# Patient Record
Sex: FEMALE | Race: WHITE | NOT HISPANIC OR LATINO | Employment: OTHER | URBAN - METROPOLITAN AREA
[De-identification: names, ages, dates, MRNs, and addresses within clinical notes are randomized per-mention and may not be internally consistent; named-entity substitution may affect disease eponyms.]

---

## 2020-04-15 ENCOUNTER — TELEMEDICINE (OUTPATIENT)
Dept: FAMILY MEDICINE CLINIC | Facility: CLINIC | Age: 70
End: 2020-04-15

## 2020-04-15 ENCOUNTER — TELEPHONE (OUTPATIENT)
Dept: FAMILY MEDICINE CLINIC | Facility: CLINIC | Age: 70
End: 2020-04-15

## 2020-04-15 DIAGNOSIS — F33.42 RECURRENT MAJOR DEPRESSIVE DISORDER, IN FULL REMISSION (HCC): ICD-10-CM

## 2020-04-15 DIAGNOSIS — M47.814 THORACIC SPONDYLOSIS: ICD-10-CM

## 2020-04-15 DIAGNOSIS — E78.2 MIXED HYPERLIPIDEMIA: ICD-10-CM

## 2020-04-15 DIAGNOSIS — M81.0 AGE-RELATED OSTEOPOROSIS WITHOUT CURRENT PATHOLOGICAL FRACTURE: Primary | ICD-10-CM

## 2020-04-15 PROCEDURE — 99203 OFFICE O/P NEW LOW 30 MIN: CPT | Performed by: FAMILY MEDICINE

## 2020-04-15 RX ORDER — TRAMADOL HYDROCHLORIDE 50 MG/1
50 TABLET ORAL EVERY 8 HOURS PRN
Qty: 60 TABLET | Refills: 2 | Status: SHIPPED | OUTPATIENT
Start: 2020-04-15 | End: 2020-08-31 | Stop reason: SDUPTHER

## 2020-04-15 NOTE — PROGRESS NOTES
Subjective   Yeimi Larson is a 69 y.o. female.     There were no vitals filed for this visit.     Chief Complaint   Patient presents with   • Establish Care     new pt [place] mo today with dr sosa    • Osteoarthritis     follow up on tramadol    • Depression     follow up no complains         History of Present Illness    This new patient appointment was converted to a video visit in accordance with CDC recommendations and guidelines given the current coronavirus pandemic    New patient here for thoracic arthritis, osteoporosis, depression, and hyperlipidemia    Patient's previous primary care provider was Dr. Jenelle Graves .  And prior to that [facility].  At this time, I do not have records from her most recent provider, as a consent has not been obtained yet.    Patient has thoracic spondylosis and has been treated with meloxicam and tramadol.  Her discomfort is well controlled with meloxicam 1/day and tramadol 2/day.  Elliot report shows that this was last filled January 29 quantity of 60.  Patient has been running low and trying to make her quantity last given her change of providers.  No aberrant behaviors.  Needs refill today    Patient's osteoporosis has previously been treated with Prolia injections.  Her last injection was well over 6 months.  Unfortunately, she was waiting for this to be approved by her last provider yet for some reason it did not happen.  She reports to me that her last bone density was approximately 2 years ago.  She is uncertain as to the location/hospital where this was done.  At this time I cannot find it in the chart review.    Patient's depression has been well controlled with Lexapro 10 mg.  She does not need a refill at this time    Patient reports she has a history of hyperlipidemia.  She is currently is on no medication for this.  She believes she has improved her diet and exercises regularly.  Thus, she believes her cholesterol levels will look much better.   She reports her lab was last checked approximately July 2019    The following portions of the patient's history were reviewed and updated as appropriate: allergies, current medications, past family history, past medical history, past social history, past surgical history and problem list.    Review of Systems   Constitutional: Negative.    HENT: Negative.    Respiratory: Negative.    Cardiovascular: Negative for chest pain and leg swelling.   Gastrointestinal: Negative.    Endocrine: Negative.    Genitourinary: Negative.    Musculoskeletal: Positive for arthralgias, back pain and myalgias.   Allergic/Immunologic: Negative.    Neurological: Negative.    Hematological: Negative.    Psychiatric/Behavioral: Negative.    I have reviewed and confirmed the accuracy of the ROS as documented by the MA/LPN/RN Gregoria Cerna MD      Objective   Physical Exam   Constitutional: She is oriented to person, place, and time. She appears well-developed. No distress.   HENT:   Head: Normocephalic and atraumatic.   Pulmonary/Chest: Effort normal.   Neurological: She is alert and oriented to person, place, and time.   Psychiatric: She has a normal mood and affect. Her behavior is normal. Judgment and thought content normal.   Nursing note and vitals reviewed.       LABS/STUDIES --per patient report labs last done July 2019 which included a hepatitis screen and lipid screen    Procedures     Assessment/Plan   Yeimi was seen today for establish care, osteoarthritis and depression.    Diagnoses and all orders for this visit:    Age-related osteoporosis without current pathological fracture --stable, I believe patient may be due for bone density.  At this time will try to obtain old bone density report.  Likely will need to schedule a bone density in the near future and then further determine treatment.?  Prolia injections.  In the interim may continue calcium along with vitamin D as prescribed.  Also continue healthy diet and regular  exercise    Thoracic spondylosis --stable.  Elliot reviewed.  Will refill tramadol 50 mg quantity of 60 per 30 days with 2 refills  -     traMADol (ULTRAM) 50 MG tablet; Take 1 tablet by mouth Every 8 (Eight) Hours As Needed for Moderate Pain .    Recurrent major depressive disorder, in full remission (CMS/HCC) --stable, no change to Lexapro.  Will refill upon request    Mixed hyperlipidemia --continue healthy diet and exercise.  Will recheck in near future at next appointment.  CMP and lipids needed        Video visit time 30 minutes         Return in about 3 months (around 7/15/2020) for Recheck.     This was an audio and video enabled telemedicine encounter.

## 2020-04-15 NOTE — TELEPHONE ENCOUNTER
PT HAD AN APPOINTMENT EARLIER TODAY AND MS HOWELL WANTED TO KNOW THE FACILITY NAME WHERE THE PATIENT HAD A BONE DENSITY AND COLONOSCOPY.     [facility]

## 2020-05-04 ENCOUNTER — TELEPHONE (OUTPATIENT)
Dept: FAMILY MEDICINE CLINIC | Facility: CLINIC | Age: 70
End: 2020-05-04

## 2020-05-04 RX ORDER — ESCITALOPRAM OXALATE 10 MG/1
10 TABLET ORAL DAILY
Qty: 90 TABLET | Refills: 3 | Status: SHIPPED | OUTPATIENT
Start: 2020-05-04 | End: 2020-12-01

## 2020-05-04 RX ORDER — MELOXICAM 15 MG/1
15 TABLET ORAL DAILY
Qty: 30 TABLET | Refills: 3 | Status: SHIPPED | OUTPATIENT
Start: 2020-05-04 | End: 2020-10-02

## 2020-08-31 ENCOUNTER — OFFICE VISIT (OUTPATIENT)
Dept: FAMILY MEDICINE CLINIC | Facility: CLINIC | Age: 70
End: 2020-08-31

## 2020-08-31 VITALS
SYSTOLIC BLOOD PRESSURE: 112 MMHG | TEMPERATURE: 97.8 F | DIASTOLIC BLOOD PRESSURE: 64 MMHG | BODY MASS INDEX: 21.95 KG/M2 | HEIGHT: 64 IN | OXYGEN SATURATION: 97 % | WEIGHT: 128.6 LBS | HEART RATE: 71 BPM

## 2020-08-31 DIAGNOSIS — Z79.899 HIGH RISK MEDICATION USE: ICD-10-CM

## 2020-08-31 DIAGNOSIS — E78.2 MIXED HYPERLIPIDEMIA: ICD-10-CM

## 2020-08-31 DIAGNOSIS — M47.814 THORACIC SPONDYLOSIS: Primary | ICD-10-CM

## 2020-08-31 DIAGNOSIS — F33.42 RECURRENT MAJOR DEPRESSIVE DISORDER, IN FULL REMISSION (HCC): ICD-10-CM

## 2020-08-31 DIAGNOSIS — M81.0 AGE-RELATED OSTEOPOROSIS WITHOUT CURRENT PATHOLOGICAL FRACTURE: ICD-10-CM

## 2020-08-31 PROCEDURE — 99214 OFFICE O/P EST MOD 30 MIN: CPT | Performed by: FAMILY MEDICINE

## 2020-08-31 RX ORDER — TRAMADOL HYDROCHLORIDE 50 MG/1
50 TABLET ORAL EVERY 8 HOURS PRN
Qty: 60 TABLET | Refills: 2 | Status: SHIPPED | OUTPATIENT
Start: 2020-09-17 | End: 2020-12-01 | Stop reason: SDUPTHER

## 2020-08-31 NOTE — PROGRESS NOTES
Subjective   Yeimi Larson is a 69 y.o. female.     Vitals:    08/31/20 0958   BP: 112/64   Pulse: 71   Temp: 97.8 °F (36.6 °C)   SpO2: 97%        Chief Complaint   Patient presents with   • Osteoarthritis     new to [facility] get established update Tramadol   • Depression     refill escitalopram   • Hyperlipidemia   • Osteoporosis        History of Present Illness    4-month follow-up for thoracic osteoarthritis, depression, hyperlipidemia, and osteoporosis    LOV with me here via telehealth to get established (new patient to me) and medication refills.  No changes made.  Medical chart has not been obtained from previous doctor yet certain documents have been scanned.  Currently, doing okay even despite the pandemic.  Staying home and practicing safe social distancing.  Trying to maintain an active lifestyle with daily walks.  No complaints today  Needs all refills and lab work due?    Thoracic spondylosis/DDD has been well controlled with meloxicam daily and tramadol 2/day.  Patient states she has been on this regimen for quite some years.  S/P physical therapy in past.  Elliot reviewed by me today shows tramadol 50 mg quantity of 60 last refilled August 18, 2020.  No aberrant behaviors.    INGRIS with depression has been well controlled with Lexapro 10 mg daily.  Tolerates medication without side effects.  Sleeps well.  Tries to maintain a healthy well-balanced diet and regular exercise.  Request refill today    History of hyperlipidemia has been well controlled with diet and exercise only.  Currently on no medications at this time.  Tries to maintain a healthy well-balanced diet and walks on a daily basis.  Weight stable.  Patient states her last blood work has been well over 1 year ago, fasting today.    History of osteoporosis was previously treated with Prolia injections in the past.  Patient believes her last Prolia injection was approximately 6 months ago?  Currently taking calcium 1200 mg daily.  Last DEXA scan  done October 2018.    The following portions of the patient's history were reviewed and updated as appropriate: allergies, current medications, past family history, past medical history, past social history, past surgical history and problem list.    Review of Systems   Constitutional: Negative for unexpected weight gain and unexpected weight loss.   Respiratory: Negative for cough and shortness of breath.    Cardiovascular: Negative for chest pain.   I have reviewed and confirmed the accuracy of the ROS as documented by the MA/LPN/RN Gregoria Cerna MD      Objective   Physical Exam   Constitutional: She appears well-developed.   HENT:   Head: Normocephalic and atraumatic.   Eyes: Pupils are equal, round, and reactive to light. Conjunctivae are normal.   Neck: Normal range of motion. Neck supple. No thyromegaly present.   Cardiovascular: Normal rate, regular rhythm and normal heart sounds.   Pulmonary/Chest: Effort normal and breath sounds normal.   Abdominal: Soft. Bowel sounds are normal. She exhibits no distension. There is no hepatosplenomegaly. There is no tenderness.   Musculoskeletal: She exhibits no edema.   Lymphadenopathy:     She has no cervical adenopathy.   Psychiatric: She has a normal mood and affect. Her behavior is normal. Judgment and thought content normal.   Nursing note and vitals reviewed.       LABS/STUDIES --last labs over 1 year ago, records unavailable                              --DEXA October 2018, lumbar spine T score -3.9, left femoral neck T score -2.1    Procedures     Assessment/Plan   Yeimi was seen today for osteoarthritis, depression, hyperlipidemia and osteoporosis.    Diagnoses and all orders for this visit:    Thoracic spondylosis --stable.  Elliot reviewed.  Will update urine tox screen for compliance today.  No change in medication.  Continue/refill Mobic 15 mg daily.  And, continue/refill today tramadol 50 mg, quantity 60 per 30 to 60 days as directed below with 2  refills.  -     traMADol (ULTRAM) 50 MG tablet; Take 1 tablet by mouth Every 8 (Eight) Hours As Needed for Moderate Pain .  -     ToxASSURE Select 13 Discrete -    Mixed hyperlipidemia --controlled?  Recheck lipids and CMP today.  Previously has been diet controlled.  Low risk patient, as long as LDL remains less than 160 then may remain off medication and continue with diet and exercise only.  -     Comprehensive Metabolic Panel  -     Lipid Panel With LDL / HDL Ratio  -     TSH    Recurrent major depressive disorder, in full remission (CMS/HCC) stable.  No change in medication.  Recheck TSH today.  Will continue/refill Lexapro 10 mg 1 p.o. daily, will refill upon request.  -     TSH    Age-related osteoporosis without current pathological fracture stable.  S/P Prolia injections over the last 2 years.  Will continue calcium 1200 mg daily.  Plan to recheck DEXA scan at next visit in 3 months.    High risk medication use  -     ToxASSURE Select 13 Discrete -                 Return in about 3 months (around 11/30/2020) for Medicare Wellness.

## 2020-09-03 LAB
6MAM UR QL CFM: NEGATIVE
6MAM/CREAT UR: NOT DETECTED NG/MG CREAT
7AMINOCLONAZEPAM/CREAT UR: NOT DETECTED NG/MG CREAT
A-OH ALPRAZ/CREAT UR: NOT DETECTED NG/MG CREAT
A-OH-TRIAZOLAM/CREAT UR CFM: NOT DETECTED NG/MG CREAT
ALBUMIN SERPL-MCNC: 4.7 G/DL (ref 3.5–5.2)
ALBUMIN/GLOB SERPL: 2 G/DL
ALFENTANIL/CREAT UR CFM: NOT DETECTED NG/MG CREAT
ALP SERPL-CCNC: 84 U/L (ref 39–117)
ALPHA-HYDROXYMIDAZOLAM, URINE: NOT DETECTED NG/MG CREAT
ALPRAZ/CREAT UR CFM: NOT DETECTED NG/MG CREAT
ALT SERPL-CCNC: 8 U/L (ref 1–33)
AMOBARBITAL UR QL CFM: NOT DETECTED
AMPHET/CREAT UR: NOT DETECTED NG/MG CREAT
AMPHETAMINES UR QL CFM: NEGATIVE
AST SERPL-CCNC: 19 U/L (ref 1–32)
BARBITAL UR QL CFM: NOT DETECTED
BARBITURATES UR QL CFM: NEGATIVE
BENZODIAZ UR QL CFM: NEGATIVE
BILIRUB SERPL-MCNC: 0.6 MG/DL (ref 0–1.2)
BUN SERPL-MCNC: 17 MG/DL (ref 8–23)
BUN/CREAT SERPL: 21.3 (ref 7–25)
BUPRENORPHINE UR QL CFM: NEGATIVE
BUPRENORPHINE/CREAT UR: NOT DETECTED NG/MG CREAT
BUTABARBITAL UR QL CFM: NOT DETECTED
BUTALBITAL UR QL CFM: NOT DETECTED
BZE/CREAT UR: NOT DETECTED NG/MG CREAT
CALCIUM SERPL-MCNC: 9.2 MG/DL (ref 8.6–10.5)
CANNABINOIDS UR QL CFM: NEGATIVE
CARBOXYTHC/CREAT UR: NOT DETECTED NG/MG CREAT
CHLORIDE SERPL-SCNC: 103 MMOL/L (ref 98–107)
CHOLEST SERPL-MCNC: 230 MG/DL (ref 0–200)
CLONAZEPAM/CREAT UR CFM: NOT DETECTED NG/MG CREAT
CO2 SERPL-SCNC: 30.7 MMOL/L (ref 22–29)
COCAETHYLENE/CREAT UR CFM: NOT DETECTED NG/MG CREAT
COCAINE UR QL CFM: NEGATIVE
COCAINE/CREAT UR CFM: NOT DETECTED NG/MG CREAT
CODEINE/CREAT UR: NOT DETECTED NG/MG CREAT
CREAT SERPL-MCNC: 0.8 MG/DL (ref 0.57–1)
CREAT UR-MCNC: 219 MG/DL
DESALKYLFLURAZ/CREAT UR: NOT DETECTED NG/MG CREAT
DESMETHYLFLUNITRAZEPAM: NOT DETECTED NG/MG CREAT
DHC/CREAT UR: NOT DETECTED NG/MG CREAT
DIAZEPAM/CREAT UR: NOT DETECTED NG/MG CREAT
DRUGS UR: NORMAL
EDDP/CREAT UR: NOT DETECTED NG/MG CREAT
ETHANOL UR CFM-MCNC: NOT DETECTED G/DL
ETHANOL UR QL CFM: NEGATIVE
FENTANYL UR QL CFM: NEGATIVE
FENTANYL/CREAT UR: NOT DETECTED NG/MG CREAT
FLUNITRAZEPAM UR QL CFM: NOT DETECTED NG/MG CREAT
GLOBULIN SER CALC-MCNC: 2.3 GM/DL
GLUCOSE SERPL-MCNC: 90 MG/DL (ref 65–99)
HDLC SERPL-MCNC: 69 MG/DL (ref 40–60)
HYDROCODONE/CREAT UR: NOT DETECTED NG/MG CREAT
HYDROMORPHONE/CREAT UR: NOT DETECTED NG/MG CREAT
LDLC SERPL CALC-MCNC: 139 MG/DL (ref 0–100)
LDLC/HDLC SERPL: 2.01 {RATIO}
LEVEL OF DETECTION:: NORMAL
LORAZEPAM/CREAT UR: NOT DETECTED NG/MG CREAT
MDA/CREAT UR: NOT DETECTED NG/MG CREAT
MDMA/CREAT UR: NOT DETECTED NG/MG CREAT
MEPHOBARBITAL UR QL CFM: NOT DETECTED
METHADONE UR QL CFM: NEGATIVE
METHADONE/CREAT UR: NOT DETECTED NG/MG CREAT
METHAMPHET/CREAT UR: NOT DETECTED NG/MG CREAT
MIDAZOLAM/CREAT UR CFM: NOT DETECTED NG/MG CREAT
MORPHINE/CREAT UR: NOT DETECTED NG/MG CREAT
N-NORTRAMADOL/CREAT UR CFM: 1580 NG/MG CREAT
NARCOTICS UR: NORMAL
NORBUPRENORPHINE/CREAT UR: NOT DETECTED NG/MG CREAT
NORCODEINE/CREAT UR CFM: NOT DETECTED NG/MG CREAT
NORDIAZEPAM/CREAT UR: NOT DETECTED NG/MG CREAT
NORFENTANYL/CREAT UR: NOT DETECTED NG/MG CREAT
NORHYDROCODONE/CREAT UR: NOT DETECTED NG/MG CREAT
NORMORPHINE UR-MCNC: NOT DETECTED NG/MG CREAT
NOROXYCODONE/CREAT UR: NOT DETECTED NG/MG CREAT
NOROXYMORPHONE/CREAT UR CFM: NOT DETECTED NG/MG CREAT
O-NORTRAMADOL UR CFM-MCNC: >2283 NG/MG CREAT
OPIATES UR QL CFM: NEGATIVE
OXAZEPAM/CREAT UR: NOT DETECTED NG/MG CREAT
OXYCODONE UR QL CFM: NEGATIVE
OXYCODONE/CREAT UR: NOT DETECTED NG/MG CREAT
OXYMORPHONE/CREAT UR: NOT DETECTED NG/MG CREAT
PENTOBARB UR QL CFM: NOT DETECTED
PHENOBARB UR QL CFM: NOT DETECTED
POTASSIUM SERPL-SCNC: 5 MMOL/L (ref 3.5–5.2)
PROT SERPL-MCNC: 7 G/DL (ref 6–8.5)
SECOBARBITAL UR QL CFM: NOT DETECTED
SODIUM SERPL-SCNC: 142 MMOL/L (ref 136–145)
SUFENTANIL/CREAT UR CFM: NOT DETECTED NG/MG CREAT
TAPENTADOL UR QL CFM: NEGATIVE
TAPENTADOL/CREAT UR: NOT DETECTED NG/MG CREAT
TEMAZEPAM/CREAT UR: NOT DETECTED NG/MG CREAT
THIOPENTAL UR QL CFM: NOT DETECTED
TRAMADOL UR QL CFM: 1715 NG/MG CREAT
TRIGL SERPL-MCNC: 112 MG/DL (ref 0–150)
TSH SERPL DL<=0.005 MIU/L-ACNC: 1.13 UIU/ML (ref 0.27–4.2)
VLDLC SERPL CALC-MCNC: 22.4 MG/DL

## 2020-10-02 RX ORDER — MELOXICAM 15 MG/1
TABLET ORAL
Qty: 90 TABLET | Refills: 0 | Status: SHIPPED | OUTPATIENT
Start: 2020-10-02 | End: 2020-12-19

## 2020-12-01 ENCOUNTER — RESULTS ENCOUNTER (OUTPATIENT)
Dept: FAMILY MEDICINE CLINIC | Facility: CLINIC | Age: 70
End: 2020-12-01

## 2020-12-01 ENCOUNTER — OFFICE VISIT (OUTPATIENT)
Dept: FAMILY MEDICINE CLINIC | Facility: CLINIC | Age: 70
End: 2020-12-01

## 2020-12-01 VITALS
HEIGHT: 64 IN | DIASTOLIC BLOOD PRESSURE: 64 MMHG | SYSTOLIC BLOOD PRESSURE: 112 MMHG | TEMPERATURE: 95.6 F | HEART RATE: 73 BPM | OXYGEN SATURATION: 97 % | WEIGHT: 130 LBS | BODY MASS INDEX: 22.2 KG/M2

## 2020-12-01 DIAGNOSIS — Z00.00 ENCOUNTER FOR MEDICARE ANNUAL WELLNESS EXAM: Primary | ICD-10-CM

## 2020-12-01 DIAGNOSIS — Z12.11 ENCOUNTER FOR SCREENING COLONOSCOPY: ICD-10-CM

## 2020-12-01 DIAGNOSIS — F33.0 MILD EPISODE OF RECURRENT MAJOR DEPRESSIVE DISORDER (HCC): ICD-10-CM

## 2020-12-01 DIAGNOSIS — Z12.31 ENCOUNTER FOR SCREENING MAMMOGRAM FOR BREAST CANCER: ICD-10-CM

## 2020-12-01 DIAGNOSIS — M81.0 AGE-RELATED OSTEOPOROSIS WITHOUT CURRENT PATHOLOGICAL FRACTURE: ICD-10-CM

## 2020-12-01 DIAGNOSIS — M47.814 THORACIC SPONDYLOSIS: ICD-10-CM

## 2020-12-01 PROBLEM — Z87.19 HISTORY OF DIVERTICULOSIS: Status: ACTIVE | Noted: 2020-12-01

## 2020-12-01 PROCEDURE — 96160 PT-FOCUSED HLTH RISK ASSMT: CPT | Performed by: FAMILY MEDICINE

## 2020-12-01 PROCEDURE — 1170F FXNL STATUS ASSESSED: CPT | Performed by: FAMILY MEDICINE

## 2020-12-01 PROCEDURE — 1160F RVW MEDS BY RX/DR IN RCRD: CPT | Performed by: FAMILY MEDICINE

## 2020-12-01 PROCEDURE — G0439 PPPS, SUBSEQ VISIT: HCPCS | Performed by: FAMILY MEDICINE

## 2020-12-01 PROCEDURE — 99214 OFFICE O/P EST MOD 30 MIN: CPT | Performed by: FAMILY MEDICINE

## 2020-12-01 PROCEDURE — 1125F AMNT PAIN NOTED PAIN PRSNT: CPT | Performed by: FAMILY MEDICINE

## 2020-12-01 RX ORDER — TRAMADOL HYDROCHLORIDE 50 MG/1
50 TABLET ORAL EVERY 8 HOURS PRN
Qty: 60 TABLET | Refills: 2 | Status: SHIPPED | OUTPATIENT
Start: 2020-12-16 | End: 2021-03-02 | Stop reason: SDUPTHER

## 2020-12-01 RX ORDER — ESCITALOPRAM OXALATE 20 MG/1
20 TABLET ORAL DAILY
Qty: 90 TABLET | Refills: 1 | Status: SHIPPED | OUTPATIENT
Start: 2020-12-01 | End: 2021-02-15

## 2020-12-01 NOTE — PROGRESS NOTES
The ABCs of the Annual Wellness Visit  Subsequent Medicare Wellness Visit    Chief Complaint   Patient presents with   • Medicare Wellness-subsequent     NO COMPLAINS   • Osteoarthritis     FOLLOW UP ON TRAMADOL NO COMPLAINS DOING WELL    • Anxiety     Having issues with sleep and stress with 's health   • Depression       Subjective   History of Present Illness:  Yeimi Larson is a 69 y.o. female who presents for a Subsequent Medicare Wellness Visit    Presents for yearly wellness exam and 3-month follow-up for chronic back pain and complains of increasing anxiety/stress/depression    LOV with me in August for medication refills and labs.  No changes made.  Overall, has been doing okay even despite the pandemic.    However, does complain of increasing caregiver stress with her 's declining health.  She states ever since he had his CVA approximately 2 years ago she has been having to take over all household chores and constantly be at his side almost 24/7.  She does have good family support from her children here in [place]; however, since the pandemic it has been difficult for them to help.  She states she cannot even go for a walk alone without her  wanting to come along and unfortunately he moves at such a slow pace and needing his walker.  She just finds her self becoming increasingly frustrated.  Also, reports poor sleep.  Currently, taking Lexapro 10 mg daily and has been on this dose ever since 2012 (after the loss of her son).    Thoracic spondylosis--- has been well controlled with a combination of Mobic 15 mg daily and tramadol 2/day times many, many years.  Tolerates both medications without side effects.  Last urine tox screen for compliance was in August 2020 and consistent with prescription history.  Elliot reviewed by me today shows tramadol quantity of 60 last refilled on 11/17/2020.  Low risk patient behavior.  Does request refill soon.    Routine health maintenance/screening  test:  Last colonoscopy approximately 10 years ago, normal, report not in computer  S/P BRADEN with BSO, secondary to endometriosis  Last mammogram greater than 2 years ago, normal  DEXA in October 2018, osteoporosis  States she had hepatitis C antibody screen, negative within the last few years at previous doctor's office, not in computer.  Pneumovax 23 in in 2017  States she had Prevnar at pharmacy  Influenza vaccine in 2020  Shingles vaccine in 2020  Tries to maintain a healthy well-balanced diet and regular cardio exercise, enjoys walking  Family history negative for colon cancer, premature CAD, breast cancer        HEALTH RISK ASSESSMENT    Recent Hospitalizations:  No hospitalization(s) within the last year.    Current Medical Providers:  Patient Care Team:  Gregoria Cerna MD as PCP - General (Family Medicine)    Smoking Status:  Social History     Tobacco Use   Smoking Status Former Smoker   Smokeless Tobacco Never Used       Alcohol Consumption:  Social History     Substance and Sexual Activity   Alcohol Use No   • Frequency: Never       Depression Screen:   PHQ-2/PHQ-9 Depression Screening 12/1/2020   Little interest or pleasure in doing things 2   Feeling down, depressed, or hopeless 1   Trouble falling or staying asleep, or sleeping too much 1   Feeling tired or having little energy 1   Poor appetite or overeating 0   Feeling bad about yourself - or that you are a failure or have let yourself or your family down 1   Trouble concentrating on things, such as reading the newspaper or watching television 2   Moving or speaking so slowly that other people could have noticed. Or the opposite - being so fidgety or restless that you have been moving around a lot more than usual 0   Thoughts that you would be better off dead, or of hurting yourself in some way 0   Total Score 8   If you checked off any problems, how difficult have these problems made it for you to do your work, take care of things at home, or get  along with other people? Somewhat difficult       Fall Risk Screen:  ALLEN Fall Risk Assessment has not been completed.    Health Habits and Functional and Cognitive Screening:  Functional & Cognitive Status 12/1/2020   Do you have difficulty preparing food and eating? No   Do you have difficulty bathing yourself, getting dressed or grooming yourself? No   Do you have difficulty using the toilet? No   Do you have difficulty moving around from place to place? No   Do you have trouble with steps or getting out of a bed or a chair? No   Current Diet Well Balanced Diet   Dental Exam Up to date   Eye Exam Up to date   Exercise (times per week) 6 times per week   Current Exercise Activities Include Walking   Do you need help using the phone?  No   Are you deaf or do you have serious difficulty hearing?  No   Do you need help with transportation? No   Do you need help shopping? No   Do you need help preparing meals?  No   Do you need help with housework?  No   Do you need help with laundry? No   Do you need help taking your medications? No   Do you need help managing money? No   Have you felt unusual stress, anger or loneliness in the last month? No   Who do you live with? Spouse   If you need help, do you have trouble finding someone available to you? No   Have you been bothered in the last four weeks by sexual problems? No   Do you have difficulty concentrating, remembering or making decisions? No         Does the patient have evidence of cognitive impairment? No    Asprin use counseling:Does not need ASA (and currently is not on it)    Age-appropriate Screening Schedule:  Refer to the list below for future screening recommendations based on patient's age, sex and/or medical conditions. Orders for these recommended tests are listed in the plan section. The patient has been provided with a written plan.    Health Maintenance   Topic Date Due   • MAMMOGRAM  1950   • COLONOSCOPY  1950   • ZOSTER VACCINE (2 of  3) 07/03/2014   • INFLUENZA VACCINE  08/01/2020   • DXA SCAN  10/03/2020   • LIPID PANEL  08/31/2021   • TDAP/TD VACCINES (2 - Td) 04/09/2029          The following portions of the patient's history were reviewed and updated as appropriate: allergies, current medications, past family history, past medical history, past social history, past surgical history and problem list.    Outpatient Medications Prior to Visit   Medication Sig Dispense Refill   • cholecalciferol (VITAMIN D3) 1000 units tablet Take 1,000 Units by mouth Daily.     • meloxicam (MOBIC) 15 MG tablet TAKE 1 TABLET EVERY DAY 90 tablet 0   • escitalopram (LEXAPRO) 10 MG tablet Take 1 tablet by mouth Daily. 90 tablet 3   • traMADol (ULTRAM) 50 MG tablet Take 1 tablet by mouth Every 8 (Eight) Hours As Needed for Moderate Pain . 60 tablet 2     No facility-administered medications prior to visit.        Patient Active Problem List   Diagnosis   • Thoracic spondylosis   • Recurrent major depressive disorder, in full remission (CMS/HCC)   • Age-related osteoporosis without current pathological fracture   • Mixed hyperlipidemia   • High risk medication use   • History of diverticulosis       Advanced Care Planning:  ACP discussion was held with the patient during this visit. Patient has an advance directive in EMR which is still valid.     Review of Systems   Constitutional: Negative for fatigue, fever and unexpected weight change.   Respiratory: Negative for shortness of breath.    Cardiovascular: Negative for chest pain.   Musculoskeletal: Positive for arthralgias and back pain.   Psychiatric/Behavioral: Positive for sleep disturbance. Negative for suicidal ideas. The patient is nervous/anxious.        Compared to one year ago, the patient feels her physical health is the same.  Compared to one year ago, the patient feels her mental health is worse.    Reviewed chart for potential of high risk medication in the elderly: not applicable  Reviewed chart for  "potential of harmful drug interactions in the elderly:not applicable    Objective         Vitals:    12/01/20 1241   BP: 112/64   Pulse: 73   Temp: 95.6 °F (35.3 °C)   SpO2: 97%   Weight: 59 kg (130 lb)   Height: 161.3 cm (63.5\")   PainSc:   2   PainLoc: Generalized       Body mass index is 22.67 kg/m².  Discussed the patient's BMI with her. The BMI is in the acceptable range.    Physical Exam  Vitals signs and nursing note reviewed.   Constitutional:       Appearance: Normal appearance. She is well-developed and normal weight.   HENT:      Head: Normocephalic and atraumatic.      Nose: Nose normal.   Eyes:      Conjunctiva/sclera: Conjunctivae normal.      Pupils: Pupils are equal, round, and reactive to light.   Neck:      Musculoskeletal: Normal range of motion and neck supple.      Thyroid: No thyromegaly.   Cardiovascular:      Rate and Rhythm: Normal rate and regular rhythm.      Heart sounds: Normal heart sounds. No murmur.   Pulmonary:      Effort: Pulmonary effort is normal.      Breath sounds: Normal breath sounds.   Abdominal:      General: Abdomen is flat. Bowel sounds are normal. There is no distension.      Palpations: Abdomen is soft. There is no hepatomegaly, splenomegaly or mass.      Tenderness: There is no abdominal tenderness. There is no guarding or rebound.      Hernia: No hernia is present.   Musculoskeletal: Normal range of motion.      Right lower leg: No edema.      Left lower leg: No edema.   Lymphadenopathy:      Cervical: No cervical adenopathy.   Skin:     General: Skin is warm.      Comments: No dysplastic nevi or abnormal lesion   Neurological:      General: No focal deficit present.      Mental Status: She is alert.   Psychiatric:         Mood and Affect: Mood normal.         Behavior: Behavior normal.         Thought Content: Thought content normal.         Judgment: Judgment normal.       LABS--- August 2020--CMP, TSH normal, , HDL 69        Assessment/Plan   Medicare Risks " and Personalized Health Plan  CMS Preventative Services Quick Reference  Advance Directive Discussion  Breast Cancer/Mammogram Screening  Cardiovascular risk  Chronic Pain   Colon Cancer Screening  Depression/Dysphoria  Diabetic Lab Screening   Fall Risk  Glaucoma Risk  Immunizations Discussed/Encouraged (specific immunizations; adacel Tdap, Influenza, Pneumococcal 23, Prevnar and Shingrix )  Osteoprorosis Risk    The above risks/problems have been discussed with the patient.  Pertinent information has been shared with the patient in the After Visit Summary.  Follow up plans and orders are seen below in the Assessment/Plan Section.    Diagnoses and all orders for this visit:    1. Encounter for Medicare annual wellness exam (Primary) --S/p subsequent  exam done, WNL.  All screening up-to-date except for needs CBC, colorectal screening (will obtain Cologuard, low risk patient), mammogram, and DEXA.  See orders listed below.  Request copy of vaccine records from pharmacy.  Otherwise, continue to improve upon a healthy well-balanced diet and regular cardio exercise greater than 150 minutes weekly.  -     CBC & Differential    2. Encounter for screening colonoscopy  -     Cologuard - Stool, Per Rectum; Future    3. Encounter for screening mammogram for breast cancer  -     Mammo Screening Bilateral With CAD; Future    4. Age-related osteoporosis without current pathological fracture --history of osteoporosis.  Previously treated over 2 years ago with Prolia injections x2.  Will recheck DEXA  If still with osteoporosis patient prefers to start bisphosphonate treatment.  Increase strength/weightbearing exercise along with cardio exercise greater than 150 minutes weekly.  Also, continue calcium 1200 to 1500 mg daily, may take Os-Talha 2/day  -     DEXA Bone Density Axial; Future    5. Mild episode of recurrent major depressive disorder (CMS/[facility]) --exacerbation/uncontrolled.  Will increase Lexapro from 10 to 20 mg  daily.  Also, strongly encourage her to get out on her own for daily walks at least 3-5 times per week.  On a good note, her  can stay alone for small periods of time.    6. Thoracic spondylosis --stable.  Urine tox screen in August, appropriate.  Elliot reviewed, appropriate.  No change with prescription.  Continue both Mobic and tramadol as prescribed.  Refill tramadol 50 mg as directed below, quantity 60 with 2 refills, next refill due 12/16/2020  -     traMADol (ULTRAM) 50 MG tablet; Take 1 tablet by mouth Every 8 (Eight) Hours As Needed for Moderate Pain .  Dispense: 60 tablet; Refill: 2    Other orders  -     Cancel: Hepatitis C Antibody  -     escitalopram (Lexapro) 20 MG tablet; Take 1 tablet by mouth Daily.  Dispense: 90 tablet; Refill: 1      Follow Up:  Return in about 3 months (around 3/1/2021) for Recheck.     An After Visit Summary and PPPS were given to the patient.     Goggles and face mask worn during entire visit

## 2020-12-01 NOTE — PATIENT INSTRUCTIONS
Increase Lexapro to 20 mg  Get DEXA, Cologuard, and mammogram as ordered  Need vaccine records from pharmacy  Recommend low fat/low calorie diet and exercise greater than 150 minutes of cardio per week.   Continue current treatment plan.

## 2020-12-02 ENCOUNTER — TRANSCRIBE ORDERS (OUTPATIENT)
Dept: ADMINISTRATIVE | Facility: HOSPITAL | Age: 70
End: 2020-12-02

## 2020-12-02 DIAGNOSIS — Z12.31 SCREENING MAMMOGRAM, ENCOUNTER FOR: Primary | ICD-10-CM

## 2020-12-02 LAB
BASOPHILS # BLD AUTO: 0.06 10*3/MM3 (ref 0–0.2)
BASOPHILS NFR BLD AUTO: 1.1 % (ref 0–1.5)
EOSINOPHIL # BLD AUTO: 0.08 10*3/MM3 (ref 0–0.4)
EOSINOPHIL NFR BLD AUTO: 1.4 % (ref 0.3–6.2)
ERYTHROCYTE [DISTWIDTH] IN BLOOD BY AUTOMATED COUNT: 12.7 % (ref 12.3–15.4)
HCT VFR BLD AUTO: 37.6 % (ref 34–46.6)
HGB BLD-MCNC: 12.1 G/DL (ref 12–15.9)
IMM GRANULOCYTES # BLD AUTO: 0 10*3/MM3 (ref 0–0.05)
IMM GRANULOCYTES NFR BLD AUTO: 0 % (ref 0–0.5)
LYMPHOCYTES # BLD AUTO: 2.15 10*3/MM3 (ref 0.7–3.1)
LYMPHOCYTES NFR BLD AUTO: 38.7 % (ref 19.6–45.3)
MCH RBC QN AUTO: 31.3 PG (ref 26.6–33)
MCHC RBC AUTO-ENTMCNC: 32.2 G/DL (ref 31.5–35.7)
MCV RBC AUTO: 97.2 FL (ref 79–97)
MONOCYTES # BLD AUTO: 0.5 10*3/MM3 (ref 0.1–0.9)
MONOCYTES NFR BLD AUTO: 9 % (ref 5–12)
NEUTROPHILS # BLD AUTO: 2.77 10*3/MM3 (ref 1.7–7)
NEUTROPHILS NFR BLD AUTO: 49.8 % (ref 42.7–76)
NRBC BLD AUTO-RTO: 0 /100 WBC (ref 0–0.2)
PLATELET # BLD AUTO: 275 10*3/MM3 (ref 140–450)
RBC # BLD AUTO: 3.87 10*6/MM3 (ref 3.77–5.28)
WBC # BLD AUTO: 5.56 10*3/MM3 (ref 3.4–10.8)

## 2020-12-16 ENCOUNTER — TELEPHONE (OUTPATIENT)
Dept: FAMILY MEDICINE CLINIC | Facility: CLINIC | Age: 70
End: 2020-12-16

## 2020-12-16 NOTE — TELEPHONE ENCOUNTER
"YANI FROM [employer] CALLED STATING THAT SHE IS GIVING A COURTESY CALL TO LET DR. HOWELL'S OFFICE KNOW THAT THEY FAXED AN ABNORMAL COLOGUARD TEST RESULT ON THE PATIENT, MARCELINO JIANG, TO DR. HOWELL'S OFFICE YESTERDAY (12/15/20).    YANI STATED THAT THE COLOGUARD TEST RESULT WAS \"POSITIVE\".    YANI STATED THAT SHE WOULD LIKE TO VERIFY THAT THIS FAX WAS RECEIVED. YANI STATED THAT SHE ONLY NEEDS A CALL BACK IF THIS FAX WAS NOT RECEIVED.    PLEASE CALL YANI FROM [employer] [phone] AND FOLLOW THE PROMPTS FOR PROVIDER SUPPORT IF THIS FAX INFORMATION WAS NOT RECEIVED.  "

## 2020-12-16 NOTE — TELEPHONE ENCOUNTER
Yes the cologuard result is in the media in the patients chart. It should be the first thing you see.

## 2020-12-18 DIAGNOSIS — R19.5 POSITIVE FIT (FECAL IMMUNOCHEMICAL TEST): Primary | ICD-10-CM

## 2020-12-19 RX ORDER — MELOXICAM 15 MG/1
TABLET ORAL
Qty: 90 TABLET | Refills: 0 | Status: SHIPPED | OUTPATIENT
Start: 2020-12-19 | End: 2021-03-02 | Stop reason: SDUPTHER

## 2020-12-29 ENCOUNTER — OFFICE VISIT (OUTPATIENT)
Dept: SURGERY | Facility: CLINIC | Age: 70
End: 2020-12-29

## 2020-12-29 VITALS — HEIGHT: 64 IN | BODY MASS INDEX: 22.2 KG/M2 | WEIGHT: 130 LBS

## 2020-12-29 DIAGNOSIS — R19.5 POSITIVE COLORECTAL CANCER SCREENING USING DNA-BASED STOOL TEST: Primary | ICD-10-CM

## 2020-12-29 PROCEDURE — 99203 OFFICE O/P NEW LOW 30 MIN: CPT | Performed by: PHYSICIAN ASSISTANT

## 2020-12-29 NOTE — PROGRESS NOTES
"CC:    Positive Cologuard test    HPI:    This is a 70-year-old lady presenting to the office today at the request of Dr. Gregoria Cerna for consultation.  She recently underwent a screening Cologuard test which was returned positive.  She states that for her entire life she has had issues with constipation but has had no changes to her bowel habits of recent.  She denies having dark tarry stools, bright red blood with her bowel movements, unexpected weight loss, abdominal pain, abdominal distention, nausea, vomiting or any additional complaints.    PMH:    Arthritis  Hyperlipidemia  History of diverticulitis  Osteoporosis  Depression    PSH:    Colonoscopy 2006  Hysterectomy  Tonsillectomy    SH:  Former smoker, does not consume alcohol    FMH:  No colorectal cancer no family history    ALLERGIES:   Penicillin    MEDICATIONS:  Reviewed and reconciled in Epic.    ROS:    Positive for constipation, environmental allergies, postnasal drip, sinus pressure, eye itching, joint pain, neck stiffness and depression.  All other systems reviewed and negative other than presenting complaints.    PE:  Vitals: Weight 130 height 64\" BMI 22.3  Constitutional: Well-nourished, well-developed female no acute distress  HEENT: Normocephalic, atraumatic, sclera anicteric, normal conjunctiva  Pulmonary: Clear to auscultation bilaterally, normal respiratory effort, no wheezes, rales or rhonchi noted  Cardiac: Regular rate and rhythm, no murmurs, gallops or rubs noted  Abdomen: Soft, nontender, nondistended, normal bowel sounds are present  Skin: Warm, dry, intact, no rashes noted  Musculoskeletal: Normal gait, no joint asymmetries noted  Psych: Alert and orient x3, normal affect, normal judgment    CLINICAL SUMMARY (A/P):    This is a 70-year-old lady presenting with a positive Cologuard test.  She is asymptomatic otherwise.  I recommended proceeding with a colonoscopy to further investigate her findings.  The risks and rationale were " discussed with her with the risk including but not limited to bleeding, infection, bowel perforation and the need for additional procedures.  Any additional follow-up will be discussed once the results have been reviewed.  All questions were answered and she was willing to proceed with all recommendations.      Darrell Boyer PA-C

## 2020-12-31 ENCOUNTER — TRANSCRIBE ORDERS (OUTPATIENT)
Dept: ADMINISTRATIVE | Facility: HOSPITAL | Age: 70
End: 2020-12-31

## 2020-12-31 DIAGNOSIS — Z01.818 OTHER SPECIFIED PRE-OPERATIVE EXAMINATION: Primary | ICD-10-CM

## 2021-01-04 ENCOUNTER — LAB (OUTPATIENT)
Dept: LAB | Facility: HOSPITAL | Age: 71
End: 2021-01-04

## 2021-01-04 DIAGNOSIS — Z01.818 OTHER SPECIFIED PRE-OPERATIVE EXAMINATION: ICD-10-CM

## 2021-01-04 PROCEDURE — C9803 HOPD COVID-19 SPEC COLLECT: HCPCS

## 2021-01-04 PROCEDURE — U0004 COV-19 TEST NON-CDC HGH THRU: HCPCS

## 2021-01-05 LAB — SARS-COV-2 RNA RESP QL NAA+PROBE: NOT DETECTED

## 2021-01-06 ENCOUNTER — ANESTHESIA (OUTPATIENT)
Dept: GASTROENTEROLOGY | Facility: HOSPITAL | Age: 71
End: 2021-01-06

## 2021-01-06 ENCOUNTER — ANESTHESIA EVENT (OUTPATIENT)
Dept: GASTROENTEROLOGY | Facility: HOSPITAL | Age: 71
End: 2021-01-06

## 2021-01-06 ENCOUNTER — HOSPITAL ENCOUNTER (OUTPATIENT)
Facility: HOSPITAL | Age: 71
Setting detail: HOSPITAL OUTPATIENT SURGERY
Discharge: HOME | End: 2021-01-06
Attending: SURGERY | Admitting: SURGERY

## 2021-01-06 VITALS
BODY MASS INDEX: 21.63 KG/M2 | OXYGEN SATURATION: 99 % | DIASTOLIC BLOOD PRESSURE: 72 MMHG | WEIGHT: 126 LBS | TEMPERATURE: 97.8 F | RESPIRATION RATE: 18 BRPM | SYSTOLIC BLOOD PRESSURE: 134 MMHG | HEART RATE: 64 BPM

## 2021-01-06 PROCEDURE — 45378 DIAGNOSTIC COLONOSCOPY: CPT | Performed by: SURGERY

## 2021-01-06 PROCEDURE — 25010000002 PROPOFOL 10 MG/ML EMULSION: Performed by: ANESTHESIOLOGY

## 2021-01-06 RX ORDER — SODIUM CHLORIDE 0.9 % (FLUSH) 0.9 %
10 SYRINGE (ML) INJECTION AS NEEDED
Status: DISCONTINUED | OUTPATIENT
Start: 2021-01-06 | End: 2021-01-06 | Stop reason: HOSPADM

## 2021-01-06 RX ORDER — PROPOFOL 10 MG/ML
VIAL (ML) INTRAVENOUS AS NEEDED
Status: DISCONTINUED | OUTPATIENT
Start: 2021-01-06 | End: 2021-01-06 | Stop reason: SURG

## 2021-01-06 RX ORDER — LIDOCAINE HYDROCHLORIDE 10 MG/ML
0.5 INJECTION, SOLUTION INFILTRATION; PERINEURAL ONCE AS NEEDED
Status: DISCONTINUED | OUTPATIENT
Start: 2021-01-06 | End: 2021-01-06 | Stop reason: HOSPADM

## 2021-01-06 RX ORDER — SODIUM CHLORIDE, SODIUM LACTATE, POTASSIUM CHLORIDE, CALCIUM CHLORIDE 600; 310; 30; 20 MG/100ML; MG/100ML; MG/100ML; MG/100ML
1000 INJECTION, SOLUTION INTRAVENOUS CONTINUOUS
Status: DISCONTINUED | OUTPATIENT
Start: 2021-01-06 | End: 2021-01-06 | Stop reason: HOSPADM

## 2021-01-06 RX ORDER — PROPOFOL 10 MG/ML
VIAL (ML) INTRAVENOUS CONTINUOUS PRN
Status: DISCONTINUED | OUTPATIENT
Start: 2021-01-06 | End: 2021-01-06 | Stop reason: SURG

## 2021-01-06 RX ADMIN — PROPOFOL 100 MG: 10 INJECTION, EMULSION INTRAVENOUS at 07:33

## 2021-01-06 RX ADMIN — PROPOFOL 160 MCG/KG/MIN: 10 INJECTION, EMULSION INTRAVENOUS at 07:33

## 2021-01-06 RX ADMIN — SODIUM CHLORIDE, POTASSIUM CHLORIDE, SODIUM LACTATE AND CALCIUM CHLORIDE 1000 ML: 600; 310; 30; 20 INJECTION, SOLUTION INTRAVENOUS at 07:09

## 2021-01-06 NOTE — OP NOTE
PREOPERATIVE DIAGNOSIS:  Positive Cologuard    POSTOPERATIVE DIAGNOSIS AND FINDINGS:  Normal mucosa  Minimal diverticulosis throughout colon, more significantly within the sigmoid    PROCEDURE:  Colonoscopy to terminal ileum    SURGEON:  Chinedu Ball MD    ANESTHESIA:  MAC    SPECIMEN(S):  none    DESCRIPTION:  In decubitus position digital rectal exam was normal. Colonoscope inserted under direct visualization of lumen to cecum confirmed by visualization of ileocecal valve and appendiceal orifice.  Ileocecal valve was intubated and terminal ileum was grossly normal.    Scope slowly withdrawn circumferentially examining all mucosal surfaces.    Quality of bowel preparation good.    There were no mucosal abnormalities.     Scattered diverticulosis seen.    Tolerated well.    RECOMMENDATION FOR FUTURE SURVEILLANCE:  10 years    Chinedu Ball M.D.

## 2021-01-06 NOTE — ANESTHESIA PREPROCEDURE EVALUATION
Anesthesia Evaluation     Patient summary reviewed and Nursing notes reviewed   history of anesthetic complications: PONV               Airway   Mallampati: II  TM distance: >3 FB  Neck ROM: full  no difficulty expected  Dental - normal exam     Pulmonary - negative pulmonary ROS    breath sounds clear to auscultation  (-) shortness of breath, sleep apnea, decreased breath sounds, wheezes  Cardiovascular - normal exam  Exercise tolerance: good (4-7 METS)    Rhythm: regular  Rate: normal    (+) hyperlipidemia,   (-) past MI, angina, CHF, orthopnea, PND, CERRATO, PVD      Neuro/Psych- negative ROS  (-) seizures, neuromuscular disease, TIA, CVA, dizziness/light headedness, weakness, numbness  GI/Hepatic/Renal/Endo - negative ROS   (-) liver disease, diabetes    Musculoskeletal     Abdominal  - normal exam   Substance History - negative use  (-) alcohol use, drug use     OB/GYN negative ob/gyn ROS         Other   arthritis,                      Anesthesia Plan    ASA 2     MAC   (D/W pt. MAC and possible awareness intra op.  Pt understands MAC and GA are not the same and the possibility of GA being required for failed MAC)  intravenous induction     Anesthetic plan, all risks, benefits, and alternatives have been provided, discussed and informed consent has been obtained with: patient.

## 2021-01-06 NOTE — ANESTHESIA POSTPROCEDURE EVALUATION
Patient: Yeimi Larson    Procedure Summary     Date: 01/06/21 Room / [facility]:  [facility]    Anesthesia Start: 0730 Anesthesia Stop: 0756    Procedure: COLONOSCOPY to Cecum (N/A ) Diagnosis:       Positive colorectal cancer screening using DNA-based stool test      (Positive colorectal cancer screening using DNA-based stool test [R19.5])    Surgeon: Chinedu Ball MD Provider: Aldo Krueger MD    Anesthesia Type: MAC ASA Status: 2          Anesthesia Type: MAC    Vitals  No vitals data found for the desired time range.          Post Anesthesia Care and Evaluation    Patient location during evaluation: bedside  Patient participation: complete - patient participated  Level of consciousness: awake and alert  Pain management: adequate  Airway patency: patent  Anesthetic complications: No anesthetic complications    Cardiovascular status: acceptable  Respiratory status: acceptable  Hydration status: acceptable    Comments: BP (P) 119/67 (BP Location: Left arm, Patient Position: Lying)   Pulse (P) 67   Temp 36.6 °C (97.8 °F) (Oral)   Resp (P) 14   Wt 57.2 kg (126 lb)   SpO2 (P) 100%   BMI 21.63 kg/m²

## 2021-01-30 ENCOUNTER — HOSPITAL ENCOUNTER (OUTPATIENT)
Dept: MAMMOGRAPHY | Facility: HOSPITAL | Age: 71
Discharge: HOME | End: 2021-01-30
Admitting: FAMILY MEDICINE

## 2021-01-30 DIAGNOSIS — Z12.31 SCREENING MAMMOGRAM, ENCOUNTER FOR: ICD-10-CM

## 2021-01-30 PROCEDURE — 77067 SCR MAMMO BI INCL CAD: CPT

## 2021-01-30 PROCEDURE — 77063 BREAST TOMOSYNTHESIS BI: CPT

## 2021-02-05 ENCOUNTER — HOSPITAL ENCOUNTER (OUTPATIENT)
Dept: BONE DENSITY | Facility: HOSPITAL | Age: 71
Discharge: HOME | End: 2021-02-05

## 2021-02-05 ENCOUNTER — HOSPITAL ENCOUNTER (OUTPATIENT)
Dept: MAMMOGRAPHY | Facility: HOSPITAL | Age: 71
Discharge: HOME | End: 2021-02-05

## 2021-02-05 DIAGNOSIS — M81.0 AGE-RELATED OSTEOPOROSIS WITHOUT CURRENT PATHOLOGICAL FRACTURE: ICD-10-CM

## 2021-02-05 PROCEDURE — 77080 DXA BONE DENSITY AXIAL: CPT

## 2021-02-08 ENCOUNTER — TELEPHONE (OUTPATIENT)
Dept: FAMILY MEDICINE CLINIC | Facility: CLINIC | Age: 71
End: 2021-02-08

## 2021-02-08 NOTE — TELEPHONE ENCOUNTER
Pt called and states she is in a lot of pain. She went to ER yesterday and was advised to see PCP ASAP. She states she does not want to go back to ER at this point as she has been there multiple times with little help

## 2021-02-08 NOTE — TELEPHONE ENCOUNTER
"Patient calling with complaints of severe muscle spasms, \"cannot move\" patient crying in pain. Patient has already proceeded to both [facility] (1/26) and most recently [facility] for this condition (2/5, in Care Everywhere) patient states that she has been given three different diagnoses? She has been informed to schedule with her pcp asap. Please advise.   "

## 2021-02-09 ENCOUNTER — OFFICE VISIT (OUTPATIENT)
Dept: FAMILY MEDICINE CLINIC | Facility: CLINIC | Age: 71
End: 2021-02-09

## 2021-02-09 VITALS
HEART RATE: 86 BPM | HEIGHT: 64 IN | BODY MASS INDEX: 21.51 KG/M2 | DIASTOLIC BLOOD PRESSURE: 70 MMHG | OXYGEN SATURATION: 97 % | TEMPERATURE: 97.3 F | SYSTOLIC BLOOD PRESSURE: 122 MMHG | WEIGHT: 126 LBS

## 2021-02-09 DIAGNOSIS — M54.42 ACUTE LEFT-SIDED LOW BACK PAIN WITH LEFT-SIDED SCIATICA: ICD-10-CM

## 2021-02-09 DIAGNOSIS — M47.814 THORACIC SPONDYLOSIS: ICD-10-CM

## 2021-02-09 DIAGNOSIS — Z09 HOSPITAL DISCHARGE FOLLOW-UP: Primary | ICD-10-CM

## 2021-02-09 DIAGNOSIS — M50.30 DDD (DEGENERATIVE DISC DISEASE), CERVICAL: ICD-10-CM

## 2021-02-09 PROCEDURE — 96372 THER/PROPH/DIAG INJ SC/IM: CPT | Performed by: FAMILY MEDICINE

## 2021-02-09 PROCEDURE — 99215 OFFICE O/P EST HI 40 MIN: CPT | Performed by: FAMILY MEDICINE

## 2021-02-09 RX ORDER — CYCLOBENZAPRINE HCL 10 MG
10 TABLET ORAL 3 TIMES DAILY PRN
Qty: 60 TABLET | Refills: 1 | Status: SHIPPED | OUTPATIENT
Start: 2021-02-09 | End: 2022-11-29

## 2021-02-09 RX ORDER — PREDNISONE 20 MG/1
TABLET ORAL
Qty: 15 TABLET | Refills: 0 | Status: SHIPPED | OUTPATIENT
Start: 2021-02-09 | End: 2021-03-05

## 2021-02-09 RX ORDER — IBUPROFEN 600 MG/1
600 TABLET ORAL
COMMUNITY
Start: 2021-02-07 | End: 2021-02-10

## 2021-02-09 RX ORDER — DIAZEPAM 5 MG/1
5 TABLET ORAL
COMMUNITY
Start: 2021-02-07 | End: 2021-02-10

## 2021-02-09 RX ORDER — HYDROCODONE BITARTRATE AND ACETAMINOPHEN 10; 325 MG/1; MG/1
1 TABLET ORAL EVERY 6 HOURS PRN
Qty: 60 TABLET | Refills: 0 | Status: SHIPPED | OUTPATIENT
Start: 2021-02-09 | End: 2021-03-05

## 2021-02-09 RX ORDER — KETOROLAC TROMETHAMINE 30 MG/ML
60 INJECTION, SOLUTION INTRAMUSCULAR; INTRAVENOUS ONCE
Status: COMPLETED | OUTPATIENT
Start: 2021-02-09 | End: 2021-02-09

## 2021-02-09 RX ADMIN — KETOROLAC TROMETHAMINE 60 MG: 30 INJECTION, SOLUTION INTRAMUSCULAR; INTRAVENOUS at 13:25

## 2021-02-09 NOTE — PROGRESS NOTES
Subjective   Yeimi Larson is a 70 y.o. female.     Vitals:    02/09/21 0808   BP: 122/70   Pulse: 86   Temp: 97.3 °F (36.3 °C)   SpO2: 97%        Chief Complaint   Patient presents with   • Spasms     EARLY JANUARY HAD COLONOSCOPY THEN STARTED WITH TERRIBLE NECK PAIN/STIFFNESS HAS BEEN TO URGENT CARE TWICE AND [place] [facility] GIVEN MUSCLE RELAXERS STEROID PACK CT SCAN OF NECK TERRIBLE LE WEAKNESS CANT HARDLY WALK PAIN IN BACK HIP        History of Present Illness    Work in/acute appointment for [facility] visits x2 and ER visit for uncontrolled cervical pain and now with complaints of low back pain into left leg    Patient called office yesterday asking to be worked in after being seen on 3 separate occasions by the immediate care center and emergency room for persistent uncontrolled cervical pain and spasms over the last 4 weeks.    [facility] care visit #1 --on 1/14/2021--she was diagnosed with acute cervical myofascial syndrome and given the muscle relaxer Flexeril.  She states this really did not help and continue to have persistent discomfort in her neck, no radiation into her arms.  Thus, she went back to the immediate care center approximately 10 days later.    [facility] care center visit #2 --on 1/26/2021--for persistent neck pain/spasms and complaints of a sore throat.  At this visit she underwent testing for strep, influenza, and COVID-19 testing all of which were negative.  Again, she was diagnosed with acute cervical myofascial pain and given this time a prescription for Medrol Dosepak.  The steroid pack did help somewhat as it gave her some relief for short time.  Although, the cervical pain and spasms restarted and became more persistent.  Thus she went to the emergency room yesterday.    [facility] ER visit --on 2/7/2021 --diagnosed with uncontrolled cervical pain.  At this visit, a CT of her cervical spine without contrast was performed and consistent with multilevel moderately significant DDD.  She was given a  prescription for Valium 5 mg a quantity of 12 tablets, Motrin 600 mg 3 times daily (told to hold her meloxicam) and to continue her tramadol as needed.    Currently, still with a significant amount of cervical pain and discomfort.  No radiation into her upper extremities.  But, now with low back pain that is radiating into her left hip/buttocks and into her left leg to the knee.  She is in so much discomfort that she can hardly walk, in a wheelchair today.  No loss of bowel function, no urinary retention.  She has a known history of thoracic DDD/spondylosis and as recently demonstrated on CT of cervical spine multilevel moderate cervical DDD.  She states the tramadol has been ineffective.  Review of [facility] shows that the tramadol was last refilled for quantity of 60 on 1/19/2021, prior to that last refill was 12/3/2020  Low risk patient behavior.    The following portions of the patient's history were reviewed and updated as appropriate: allergies, current medications, past family history, past medical history, past social history, past surgical history and problem list.    Review of Systems   Constitutional: Negative for fever, unexpected weight gain and unexpected weight loss.   Respiratory: Negative for cough and shortness of breath.    Cardiovascular: Negative for chest pain.   Musculoskeletal: Positive for back pain and neck pain.   Psychiatric/Behavioral: Positive for sleep disturbance.       Objective   Physical Exam  Vitals signs and nursing note reviewed.   Constitutional:       Appearance: Normal appearance. She is well-developed.      Comments: Uncomfortable, in wheelchair today.  Here with her daughter   HENT:      Head: Normocephalic and atraumatic.      Nose: Nose normal.   Neck:      Musculoskeletal: Normal range of motion and neck supple.      Thyroid: No thyromegaly.   Cardiovascular:      Rate and Rhythm: Normal rate and regular rhythm.      Heart sounds: Normal heart sounds.   Pulmonary:       Effort: Pulmonary effort is normal.      Breath sounds: Normal breath sounds.   Abdominal:      General: Abdomen is flat. Bowel sounds are normal. There is no distension.      Palpations: Abdomen is soft.      Tenderness: There is no abdominal tenderness.   Musculoskeletal:      Right lower leg: No edema.      Left lower leg: No edema.      Comments: Neck--tenderness at base of C-spine, normal range of motion with normal strength of upper extremities    Low back--tender in paraspinal muscles left greater than right into left buttocks, positive straight leg raise on left, normal strength and reflexes bilaterally of the lower extremities     Lymphadenopathy:      Cervical: No cervical adenopathy.   Neurological:      Mental Status: She is alert.          LABS/STUDIES --immediate care center notes reviewed x2, [facility] records reviewed                                2/7/2021--CT of cervical spine without contrast --multilevel moderate DDD                                August 2020 --urine tox screen reviewed, appropriate, CMP normal    Procedures     Assessment/Plan   Diagnoses and all orders for this visit:    1. Hospital discharge follow-up (Primary) --- immediate care center visits x2 reviewed, S/P ER visit on 2/7/2021 reviewed--all visits for uncontrolled cervical pain    2. Acute left-sided low back pain with left-sided sciatica --new diagnosis/uncontrolled  Most likely with lumbar DDD and likely disc bulge with sciatica, possible disc herniation?  Known history of significant cervical and thoracic DDD  We will try to treat conservatively at this point given no signs of cauda equina syndrome  Start prednisone 40 mg x 5 days then 20 mg x 5 days  Start Flexeril 10 mg 1 p.o. 3 times daily as needed  We will add Norco 10 mg as directed below as needed moderate to severe pain, quantity 60 with no refills.  Elliot intact screen both reviewed and up-to-date, appropriate  She may take the Norco for severe pain and use  her current prescription of tramadol for mild discomfort.  May continue her meloxicam after the prednisone dose is finished  Will give Toradol injection today given severe discomfort and daughter is present to drive home  Will refer to pain management for likely therapeutic injections, also for C-spine injections  Patient has been instructed if not better, worse, or any concerning signs of bowel incontinence or urinary retention then she is to go to the ER or at least call back  May need an MRI?  -     Ambulatory Referral to Pain Management  -     ketorolac (TORADOL) injection 60 mg    3. DDD (degenerative disc disease), cervical --uncontrolled/persistent.  Has failed conservative treatment.  See above treatment plan.  Urine tox screen done in August, appropriate.  Elliot reviewed, appropriate.  To pain management for likely cervical injections  -     Ambulatory Referral to Pain Management  -     ketorolac (TORADOL) injection 60 mg    4. Thoracic spondylosis --stable.  Continue meloxicam and tramadol as prescribed, will refill upon request    Other orders  -     predniSONE (DELTASONE) 20 MG tablet; Take 2 by mouth x5 days then 1 by mouth x5 days  Dispense: 15 tablet; Refill: 0  -     cyclobenzaprine (FLEXERIL) 10 MG tablet; Take 1 tablet by mouth 3 (Three) Times a Day As Needed for Muscle Spasms.  Dispense: 60 tablet; Refill: 1  -     HYDROcodone-acetaminophen (NORCO)  MG per tablet; Take 1 tablet by mouth Every 6 (Six) Hours As Needed for Moderate Pain .  Dispense: 60 tablet; Refill: 0    Please keep regular follow-up appointment as scheduled in March, may need to be seen sooner if symptoms worsen.    Total appointment time 45 minutes----much of this time was spent reviewing 3 sets of records from the [facility] and [place] [facility], CT of cervical spine.  And, significant amount of time involved in medical decision making and counseling both the patient and her daughter regarding natural history, treatment plan, and  red flags of concern.             Wore goggles and face mask during entire visit.    Return in about 4 weeks (around 3/9/2021), or Keep regular appointment as planned.

## 2021-02-12 NOTE — PATIENT INSTRUCTIONS
Start prednisone as prescribed  Start Flexeril  May use Norco for moderate to severe pain as needed, as directed  Continue current treatment plan.

## 2021-02-15 ENCOUNTER — TELEPHONE (OUTPATIENT)
Dept: FAMILY MEDICINE CLINIC | Facility: CLINIC | Age: 71
End: 2021-02-15

## 2021-02-15 RX ORDER — ESCITALOPRAM OXALATE 20 MG/1
TABLET ORAL
Qty: 30 TABLET | Refills: 0 | Status: SHIPPED | OUTPATIENT
Start: 2021-02-15 | End: 2021-03-02 | Stop reason: SDUPTHER

## 2021-02-15 NOTE — TELEPHONE ENCOUNTER
Patient is just checking on pain management appointment states she wrote something down but unsure where and when she is going please advise

## 2021-02-24 NOTE — PROGRESS NOTES
The patient has a pain history of the following:  Chronic pain syndrome  Low back pain  Lumbar disc disease  Cervical disc disease   Osteoporosis     Previous interventions that the patient has received include:   None    Pain medications include:  Flexeril  Hydrocodone-acetaminophen 10-325mg - helps  Meloxicam  Prednisone   Tramadol (chronic medication)  Valium   Salonpas  Biofreeze     Previously: Medrol dosepack - helped, Toradol IM    Other conservative modalities which the patient reports using include:  Physical Therapy: yes for back pain years ago  Chiropractor: no  Massage Therapy: no  Neck or back surgery: no  Past pain management: yes  Heat - helps     Past Significant Surgical History:  No new     HPI:       CHIEF COMPLAINT: Neck Pain      Yeimi Larson is a 70 y.o. female referred here by Gregoria Cerna MD. Yeimi Larson presents to the office for evaluation and treatment of Neck Pain      Onset:  January  Inciting Event:  After colonoscopy  Location:  Neck/entire body  Pain: Pain described as ache, sharp and shooting. Located in her neck and does radiate down her back and into her legs; it does not travel into her arm.  Severity:  Pain rated as a 5 /10.  Symptoms have been constant.  Exacerbation:  Sitting for long periods of time.   Alleviation:  Steroids and hydrocodone have helped some.  Associated Symptoms:   She denies any new onset of bowel or bladder weakness, significant leg weakness or saddle anesthesia. Denies balance problems or lower extremity incoordination.  Ambulates: Without assistive device  Limitations: This pain limits the patient from getting out of bed initially, now it keeps her from going on walks daily with her  and being his caregiver.   Goals: Functional goals include ability to do the above.          PEG Assessment   What number best describes your pain on average in the past week?7  What number best describes how, during the past week, pain has interfered with your  enjoyment of life?7  What number best describes how, during the past week, pain has interfered with your general activity?  7        Current Outpatient Medications:   •  cholecalciferol (VITAMIN D3) 1000 units tablet, Take 1,000 Units by mouth Daily., Disp: , Rfl:   •  cyclobenzaprine (FLEXERIL) 10 MG tablet, Take 1 tablet by mouth 3 (Three) Times a Day As Needed for Muscle Spasms., Disp: 60 tablet, Rfl: 1  •  escitalopram (LEXAPRO) 20 MG tablet, TAKE 1 TABLET EVERY DAY, Disp: 30 tablet, Rfl: 0  •  HYDROcodone-acetaminophen (NORCO)  MG per tablet, Take 1 tablet by mouth Every 6 (Six) Hours As Needed for Moderate Pain ., Disp: 60 tablet, Rfl: 0  •  meloxicam (MOBIC) 15 MG tablet, TAKE 1 TABLET EVERY DAY, Disp: 90 tablet, Rfl: 0  •  predniSONE (DELTASONE) 20 MG tablet, Take 2 by mouth x5 days then 1 by mouth x5 days, Disp: 15 tablet, Rfl: 0  •  traMADol (ULTRAM) 50 MG tablet, Take 1 tablet by mouth Every 8 (Eight) Hours As Needed for Moderate Pain ., Disp: 60 tablet, Rfl: 2    The following portions of the patient's history were reviewed and updated as appropriate: allergies, current medications, past family history, past medical history, past social history, past surgical history and problem list.      REVIEW OF PERTINENT MEDICAL DATA  CT Cervical Spine Wo Contrast  2021  [facility]  Result Narrative   RADIOLOGY REPORT    FACILITY:  [facility]  UNIT/AGE/GENDER: J.ED  ER      AGE:70 Y          SEX:F  PATIENT NAME/:  MARCELINO JIANG    1950  UNIT NUMBER:  [id number]  ACCOUNT NUMBER:  [id number]  ACCESSION NUMBER:  [id number]      CT Cervical Spine without Contrast,    2021 at 1420 hours    HISTORY: Neck pain, recent trauma.         TECHNIQUE: Multiple axial images were obtained of the cervical spine. Sagittal and coronal reformats were performed. No IV or intrathecal contrast was given. The radiation dose reduction technique was used to obtain ALARA for the exam.      COMPARISON: No    FINDINGS: Moderate degenerative disc disease is noted at C3-C4, C4-C5, C5-C6, and C6-C7. There is no evidence of fracture. Alignment is within normal limits. The prevertebral soft tissues are unremarkable. The lung apices are clear.    C2-3: Negative    C3-4: Broad-based disc osteophyte complex, uncovertebral spurring and mild facet disease result in mild-to-moderate bilateral neuroforaminal narrowing    C4-5: Broad-based disc osteophyte complex, uncovertebral spurring and facet disease resulting in mild-to-moderate bilateral neuroforaminal narrowing    C5-6: Broad-based disc osteophyte complex, uncovertebral spurring and facet disease result in mild-to-moderate bilateral neuroforaminal narrowing    C6-7: Broad-based disc osteophyte complex and facet disease resulting in mild bilateral neuroforaminal narrowing    C7-T1: Negative    IMPRESSION:    1. No evidence of fracture or malalignment.  2. Multilevel degenerative changes of the cervical spine.    Dictated by: Cassidy Florian M.D.    Images and Report reviewed and interpreted by: Cassidy Florian M.D.    <PS><Electronically signed by: Cassidy Florian M.D.>  02/07/2021 1435    D: 02/07/2021 1431  T: 02/07/2021 1431     12/1/2020 Platelets 275 (10*3)    8/31/2020 Creatinine 0.80      Review of Systems   Constitutional: Positive for activity change (DECREASED). Negative for chills, fatigue and fever.   HENT: Negative for congestion.    Eyes: Negative for visual disturbance.   Respiratory: Negative for chest tightness and shortness of breath.    Cardiovascular: Negative for chest pain.   Gastrointestinal: Positive for constipation. Negative for abdominal pain and diarrhea.   Genitourinary: Negative for difficulty urinating, dyspareunia and dysuria.   Musculoskeletal: Positive for back pain and neck pain.   Neurological: Positive for dizziness and numbness (RIGHT ARM). Negative for weakness, light-headedness and headaches.  "  Psychiatric/Behavioral: Positive for sleep disturbance. Negative for agitation. The patient is not nervous/anxious.      I have reviewed and confirmed the accuracy of the ROS as documented by the MA/LPN/RN Shelly Paredes MD      Vitals:    02/25/21 1004   BP: 125/70   Pulse: 112   Resp: 16   Temp: 96.6 °F (35.9 °C)   SpO2: 95%   Weight: 62.1 kg (136 lb 12.8 oz)   Height: 162.6 cm (64\")   PainSc:   5   PainLoc: Back         Objective   Physical Exam  Vitals signs reviewed.   Constitutional:       General: She is not in acute distress.  Cardiovascular:      Rate and Rhythm: Tachycardia present.   Pulmonary:      Effort: Pulmonary effort is normal. No respiratory distress.   Musculoskeletal:      Comments: Cervical Examination:  Appearance: Posterior scarring absent.  Range of Motion: diminished range with pain  Cervical paravertebral regions and transverse processes are tender.  The Cervical Paraspinous, Trapezius, Levator, Rhomboid and Sternocleidomastoid  muscles are tender to palpation, bilaterally.   Skin:     General: Skin is warm and dry.   Neurological:      General: No focal deficit present.      Mental Status: She is alert.      Deep Tendon Reflexes:      Reflex Scores:       Bicep reflexes are 2+ on the right side and 2+ on the left side.       Brachioradialis reflexes are 2+ on the right side and 2+ on the left side.     Comments: Negative bilateral Cali's   Psychiatric:         Thought Content: Thought content normal.         Judgment: Judgment normal.         Assessment/Plan   Diagnoses and all orders for this visit:    1. Myofasciitis (Primary)  -     Case Request  -     Ambulatory Referral to Physical Therapy Evaluate and treat (Neck and back pain)    2. Cervical disc disease  -     Case Request  -     Ambulatory Referral to Physical Therapy Evaluate and treat (Neck and back pain)    3. Cervical spondylosis without myelopathy  -     Case Request  -     Ambulatory Referral to Physical Therapy " Evaluate and treat (Neck and back pain)        - Baseline urine drug screen was obtained.  - Pertinent labs reviewed.   - Pertinent imaging reviewed.   - Referral placed for physical therapy.   - Explained that she is tender generally to palpation over her entire back.  Her pain seems musculoskeletal in nature today and I am recommended trigger point injections with local only (since she's had multiple steroid doses already).  Risks discussed including but not limited to bleeding, bruising, infection, damage to surrounding structures, headache, and rare things such as being paralyzed, seizure, stroke, heart attack and death.  - Her exam is almost fibromyalgia-like.  We will keep this diagnosis in mind throughout her treatment.  She has mild-moderate foraminal narrowing, however no upper extremity radicular symptoms.  There is no canal stenosis on her cervical CT results.    - Briefly discussed trial of a different muscle relaxant, however we'll hold on medication changes at this time.   - Patient screened positive for depression based on a PHQ-9 score of 8 on 12/1/2020. Follow-up recommendations include: PCP managing depression.    --- Follow-up for trigger point injections bilateral cervical paraspinous, sternocleidomastoid, levator, trapezius and rhomboid and possibly mid/low back regions as well then office visit 2-4 weeks after.            VIPIN REPORT    As the clinician, I personally reviewed the VIPIN from 2/25/21 while the patient was in the office today.    While examining this patient, I wore protective equipment including a mask, eye shield and gloves.  I washed my hands before and after this patient encounter.  The patient wore a mask throughout the visit as well.     Shelly Paredes MD  Pain Management

## 2021-02-25 ENCOUNTER — OFFICE VISIT (OUTPATIENT)
Dept: PAIN MEDICINE | Facility: CLINIC | Age: 71
End: 2021-02-25

## 2021-02-25 VITALS
RESPIRATION RATE: 16 BRPM | HEART RATE: 112 BPM | HEIGHT: 64 IN | BODY MASS INDEX: 23.35 KG/M2 | OXYGEN SATURATION: 95 % | DIASTOLIC BLOOD PRESSURE: 70 MMHG | SYSTOLIC BLOOD PRESSURE: 125 MMHG | TEMPERATURE: 96.6 F | WEIGHT: 136.8 LBS

## 2021-02-25 DIAGNOSIS — M50.90 CERVICAL DISC DISEASE: ICD-10-CM

## 2021-02-25 DIAGNOSIS — M60.9 MYOFASCIITIS: Primary | ICD-10-CM

## 2021-02-25 DIAGNOSIS — M47.812 CERVICAL SPONDYLOSIS WITHOUT MYELOPATHY: ICD-10-CM

## 2021-02-25 LAB
POC AMPHETAMINES: NEGATIVE
POC BARBITURATES: NEGATIVE
POC BENZODIAZEPHINES: POSITIVE
POC COCAINE: NEGATIVE
POC METHADONE: NEGATIVE
POC METHAMPHETAMINE SCREEN URINE: NEGATIVE
POC OPIATES: POSITIVE
POC OXYCODONE: NEGATIVE
POC PHENCYCLIDINE: NEGATIVE
POC PROPOXYPHENE: NEGATIVE
POC THC: NEGATIVE
POC TRICYCLIC ANTIDEPRESSANTS: NEGATIVE

## 2021-02-25 PROCEDURE — 99204 OFFICE O/P NEW MOD 45 MIN: CPT | Performed by: ANESTHESIOLOGY

## 2021-02-25 PROCEDURE — 80305 DRUG TEST PRSMV DIR OPT OBS: CPT | Performed by: ANESTHESIOLOGY

## 2021-02-25 NOTE — PATIENT INSTRUCTIONS
Trigger Point Injection  Trigger points are areas where you have pain. A trigger point injection is a shot given in the trigger point to help relieve pain for a few days to a few months. Common places for trigger points include:  · The neck.  · The shoulders.  · The upper back.  · The lower back.  A trigger point injection will not cure long-term (chronic) pain permanently. These injections do not always work for every person. For some people, they can help to relieve pain for a few days to a few months.  Tell a health care provider about:  · Any allergies you have.  · All medicines you are taking, including vitamins, herbs, eye drops, creams, and over-the-counter medicines.  · Any problems you or family members have had with anesthetic medicines.  · Any blood disorders you have.  · Any surgeries you have had.  · Any medical conditions you have.  What are the risks?  Generally, this is a safe procedure. However, problems may occur, including:  · Infection.  · Bleeding or bruising.  · Allergic reaction to the injected medicine.  · Irritation of the skin around the injection site.  What happens before the procedure?  Ask your health care provider about:  · Changing or stopping your regular medicines. This is especially important if you are taking diabetes medicines or blood thinners.  · Taking medicines such as aspirin and ibuprofen. These medicines can thin your blood. Do not take these medicines unless your health care provider tells you to take them.  · Taking over-the-counter medicines, vitamins, herbs, and supplements.  What happens during the procedure?    · Your health care provider will feel for trigger points. A marker may be used to [place] the area for the injection.  · The skin over the trigger point will be washed with a germ-killing (antiseptic) solution.  · A thin needle is used for the injection. You may feel pain or a twitching feeling when the needle enters the trigger point.  · A numbing solution may  be injected into the trigger point. Sometimes a medicine to keep down inflammation is also injected.  · Your health care provider may move the needle around the area where the trigger point is located until the tightness and twitching goes away.  · After the injection, your health care provider may put gentle pressure over the injection site.  · The injection site will be covered with a bandage (dressing).  The procedure may vary among health care providers and hospitals.  What can I expect after treatment?  After treatment, you may have:  · Soreness and stiffness for 1-2 days.  · A dressing. This can be taken off in a few hours or as told by your health care provider.  Follow these instructions at home:  Injection site care  · Remove your dressing as told by your health care provider.  · Check your injection site every day for signs of infection. Check for:  ? Redness, swelling, or pain.  ? Fluid or blood.  ? Warmth.  ? Pus or a bad smell.  Managing pain, stiffness, and swelling  · If directed, put ice on the affected area.  ? Put ice in a plastic bag.  ? Place a towel between your skin and the bag.  ? Leave the ice on for 20 minutes, 2-3 times a day.  General instructions  · If you were asked to stop your regular medicines, ask your health care provider when you may start taking them again.  · Return to your normal activities as told by your health care provider. Ask your health care provider what activities are safe for you.  · Do not take baths, swim, or use a hot tub until your health care provider approves.  · You may be asked to see an occupational or physical therapist for exercises that reduce muscle strain and stretch the area of the trigger point.  · Keep all follow-up visits as told by your health care provider. This is important.  Contact a health care provider if:  · Your pain comes back, and it is worse than before the injection. You may need more injections.  · You have chills or a fever.  · The  injection site becomes more painful, red, swollen, or warm to the touch.  Summary  · A trigger point injection is a shot given in the trigger point to help relieve pain for a few days to a few months.  · Common places for trigger point injections are the neck, shoulder, upper back, and lower back.  · These injections do not always work for every person, but for some people, the injections can help to relieve pain for a few days to a few months.  · Contact a health care provider if symptoms come back or they are worse than before treatment. Also, get help if the injection site becomes more painful, red, swollen, or warm to the touch.  This information is not intended to replace advice given to you by your health care provider. Make sure you discuss any questions you have with your health care provider.  Document Revised: 01/29/2020 Document Reviewed: 01/29/2020  [employer] Patient Education © 2020 [employer]

## 2021-03-02 ENCOUNTER — OFFICE VISIT (OUTPATIENT)
Dept: FAMILY MEDICINE CLINIC | Facility: CLINIC | Age: 71
End: 2021-03-02

## 2021-03-02 VITALS
SYSTOLIC BLOOD PRESSURE: 116 MMHG | OXYGEN SATURATION: 97 % | BODY MASS INDEX: 22.94 KG/M2 | HEIGHT: 64 IN | TEMPERATURE: 98.2 F | WEIGHT: 134.4 LBS | HEART RATE: 88 BPM | DIASTOLIC BLOOD PRESSURE: 70 MMHG

## 2021-03-02 DIAGNOSIS — F33.42 RECURRENT MAJOR DEPRESSIVE DISORDER, IN FULL REMISSION (HCC): ICD-10-CM

## 2021-03-02 DIAGNOSIS — M54.42 ACUTE LEFT-SIDED LOW BACK PAIN WITH LEFT-SIDED SCIATICA: ICD-10-CM

## 2021-03-02 DIAGNOSIS — M50.30 DDD (DEGENERATIVE DISC DISEASE), CERVICAL: Primary | ICD-10-CM

## 2021-03-02 DIAGNOSIS — M47.814 THORACIC SPONDYLOSIS: ICD-10-CM

## 2021-03-02 DIAGNOSIS — M81.0 AGE-RELATED OSTEOPOROSIS WITHOUT CURRENT PATHOLOGICAL FRACTURE: ICD-10-CM

## 2021-03-02 PROCEDURE — 99214 OFFICE O/P EST MOD 30 MIN: CPT | Performed by: FAMILY MEDICINE

## 2021-03-02 RX ORDER — RISEDRONATE SODIUM 35 MG/1
35 TABLET, FILM COATED ORAL
Qty: 12 TABLET | Refills: 1 | Status: SHIPPED | OUTPATIENT
Start: 2021-03-02 | End: 2021-06-01

## 2021-03-02 RX ORDER — TRAMADOL HYDROCHLORIDE 50 MG/1
50 TABLET ORAL EVERY 8 HOURS PRN
Qty: 100 TABLET | Refills: 1 | Status: SHIPPED | OUTPATIENT
Start: 2021-03-02 | End: 2021-06-01 | Stop reason: SDUPTHER

## 2021-03-02 RX ORDER — ESCITALOPRAM OXALATE 20 MG/1
20 TABLET ORAL DAILY
Qty: 90 TABLET | Refills: 1 | Status: SHIPPED | OUTPATIENT
Start: 2021-03-02 | End: 2021-09-13

## 2021-03-02 RX ORDER — MELOXICAM 15 MG/1
15 TABLET ORAL DAILY
Qty: 90 TABLET | Refills: 1 | Status: SHIPPED | OUTPATIENT
Start: 2021-03-02 | End: 2021-07-28

## 2021-03-02 NOTE — PROGRESS NOTES
Subjective   Yeimi Larson is a 70 y.o. female.     Vitals:    03/02/21 1045   BP: 116/70   Pulse: 88   Temp: 98.2 °F (36.8 °C)   SpO2: 97%        Chief Complaint   Patient presents with   • Back Pain     bulging disk medication update   • Anxiety   • Osteoporosis        History of Present Illness    4 week F/U on newly Dx uncontrolled Cervical DDD and LBP/radiculopathy   AND 3 month F/U on anxiety with depression , chronic thoracic DDD, and osteoporosis    LOV with me about 2 weeks after 3 ICC and ER visits for uncontrolled cervical and lumbar pain.  DX with Moderate multilevel cervical DDD  (CT cervical spine done) and acute LBP with left sided radiculopathy (most likely DDD,with possible bulge? Clinical Dx)  Treated with Toradol injection in office, Prednisone taper, Flexeril, Mobic, Norco, and referral made to Pain MD.  Plans to start Cervical injections later this week.     Overall, doing much better in all regards.   Would like to stop Norco and change back to her usual Tramadol (on 2/day for chronic Thoracic DDD) due to side effects.   Urine Tox done in Aug, appropriate.   Elliot reviewed today, last refill for Norco qty 60 on 2/9/21 and Tramadol qty 60 on 2/12/21.  Currently, still needing 2-4 pain meds a day.     Also, here to follow up from Dec wellness visit and uncontrolled INGRIS with depression.  --Lexapro was increased to 20 mg a day--this is helping.   --a cologuard was ordered--came back positive and led to a Cscope which was okay except for diverticuli  --DEXA ordered to follow up on osteoporosis--still with significant osteoporosis, see results below.   Treated in the last 2 years with Prolia, yet now Ins denying. Treated very briefly in past with Actonel, does not remember why it was stopped by previous doctor.     The following portions of the patient's history were reviewed and updated as appropriate: allergies, current medications, past family history, past medical history, past social history,  past surgical history and problem list.    Review of Systems   Constitutional: Negative for fever, unexpected weight gain and unexpected weight loss.   Respiratory: Negative for cough and shortness of breath.        Objective   Physical Exam  Vitals signs and nursing note reviewed.   Constitutional:       Appearance: Normal appearance. She is well-developed and normal weight.   HENT:      Head: Normocephalic and atraumatic.      Nose: Nose normal.   Eyes:      Conjunctiva/sclera: Conjunctivae normal.      Pupils: Pupils are equal, round, and reactive to light.   Neck:      Musculoskeletal: Normal range of motion and neck supple.      Thyroid: No thyromegaly.   Cardiovascular:      Rate and Rhythm: Normal rate and regular rhythm.      Heart sounds: Normal heart sounds. No murmur.   Pulmonary:      Effort: Pulmonary effort is normal.      Breath sounds: Normal breath sounds.   Abdominal:      General: Abdomen is flat. Bowel sounds are normal. There is no distension.      Palpations: Abdomen is soft. There is no hepatomegaly, splenomegaly or mass.      Tenderness: There is no abdominal tenderness. There is no guarding or rebound.      Hernia: No hernia is present.   Musculoskeletal: Normal range of motion.      Right lower leg: No edema.      Left lower leg: No edema.      Comments: Slight paraspinal tenderness bilaterally  Negative straight leg raise bilaterally   Normal strength and reflexes bilateral lower extremities     Lymphadenopathy:      Cervical: No cervical adenopathy.   Skin:     General: Skin is warm.   Neurological:      General: No focal deficit present.      Mental Status: She is alert.   Psychiatric:         Mood and Affect: Mood normal.         Behavior: Behavior normal.         Thought Content: Thought content normal.         Judgment: Judgment normal.          LABS/STUDIES -- DEC 2020 --cologuard positive, Cscope with diverticuli only                                                      DEXA --  spine T score -3.6 (in 2018 was -3.9)                                                                    Hips T score -2.5 and -2.6 ( in 2018 was -2.1 both)                                  Aug 2020-- CBC, CMP, TSH, lipids all wnl, and urine Tox appropriate    Procedures     Assessment/Plan   Diagnoses and all orders for this visit:    1. DDD (degenerative disc disease), cervical (Primary) --new Dx/uncontrolled but improving with time and meds. Agree with plans for therapeutic injections this week   May cont both Mobic (renal function normal) and Flexeril as prescribed, will refill upon request.  Will stop Norco and change back to Tramadol (see above HPI for details)  Refill Tramadol as directed below, qty 100 per 30 -60 days  Urine tox/Aug and Elliot both reviewed and appropriate.    2. Acute left-sided low back pain with left-sided sciatica --new Dx, clinically better.   Strongly suspect Lumbar DDD  Cont with conservative Rx plan, same as above  If not better or progressive than may need a MRI?    3. Thoracic spondylosis --stable.   Same plan as outlined above  -     traMADol (ULTRAM) 50 MG tablet; Take 1 tablet by mouth Every 8 (Eight) Hours As Needed for Moderate Pain .  Dispense: 100 tablet; Refill: 1    4. Recurrent major depressive disorder, in full remission (CMS/HCC) --stable. No change with meds at this time.  Cont/refill Lexapro 20 mg q day    5. Age-related osteoporosis without current pathological fracture --clinically stable.   Essentially no significant change in DEXA in the last 2 years despite treatment with Prolia and now no longer covered by Ins.   So restart Actonel as directed below  Increase strength/weightbearing exercise along with cardio exercise greater than 150 minutes weekly.  Also, continue calcium 1200 to 1500 mg daily, may take Os-Talha 2/day  Recheck DEXA in 2 years    Other orders  -     escitalopram (LEXAPRO) 20 MG tablet; Take 1 tablet by mouth Daily.  Dispense: 90 tablet; Refill:  1  -     meloxicam (MOBIC) 15 MG tablet; Take 1 tablet by mouth Daily.  Dispense: 90 tablet; Refill: 1  -     risedronate (Actonel) 35 MG tablet; Take 1 tablet by mouth Every 7 (Seven) Days. with water on empty stomach, nothing by mouth or lie down for next 30 minutes.  Dispense: 12 tablet; Refill: 1                 Wore goggles and face mask during entire visit.    Return in about 2 months (around 5/2/2021) for Recheck.

## 2021-03-03 NOTE — PATIENT INSTRUCTIONS
Start Actonel   Same plan for chronic back and neck pain,proceed with injections  Continue current treatment plan.  If not better follow-up sooner than next regular appointment.

## 2021-03-04 NOTE — PROGRESS NOTES
"CHIEF COMPLAINT: Neck Pain      Yeimi Larson is a 70 y.o. female.  She is here for the following procedure:  trigger point injections bilateral cervical paraspinous, sternocleidomastoid, levator, trapezius and rhomboid and possibly mid/low back regions as well.      Today she presents for her injections and does not remember being here on 2/25/2021.  She isn't sure what injections we have scheduled and states her pain is back to her baseline.  When she visited her she was taking Hydrocodone and states she does not remember anything during that time period.  After speaking with her again, we have decided to cancel her procedure.      Vitals:    03/05/21 0857   BP: 128/76   Pulse: 83   Resp: 18   Temp: 96.9 °F (36.1 °C)   SpO2: 99%   Weight: 60.6 kg (133 lb 9.6 oz)   Height: 162.6 cm (64\")   PainSc:   3   PainLoc: Neck     Exam:  Mild tenderness and tightness of the trapezius and levator muscles bilaterally.     Visit Diagnoses:  1. Myofasciitis      A total of 21 minutes was spent reviewing her chart and explaining what we discussed at her previous visit.        Follow-up: 6-8 week office visit    Shelly Paredes MD  Pain Management  "

## 2021-03-05 ENCOUNTER — PROCEDURE VISIT (OUTPATIENT)
Dept: PAIN MEDICINE | Facility: CLINIC | Age: 71
End: 2021-03-05

## 2021-03-05 VITALS
TEMPERATURE: 96.9 F | SYSTOLIC BLOOD PRESSURE: 128 MMHG | OXYGEN SATURATION: 99 % | RESPIRATION RATE: 18 BRPM | BODY MASS INDEX: 22.81 KG/M2 | HEIGHT: 64 IN | WEIGHT: 133.6 LBS | HEART RATE: 83 BPM | DIASTOLIC BLOOD PRESSURE: 76 MMHG

## 2021-03-05 DIAGNOSIS — M60.9 MYOFASCIITIS: Primary | ICD-10-CM

## 2021-03-05 PROCEDURE — 99213 OFFICE O/P EST LOW 20 MIN: CPT | Performed by: ANESTHESIOLOGY

## 2021-03-05 NOTE — PATIENT INSTRUCTIONS
Trigger Point Injection  Trigger points are areas where you have pain. A trigger point injection is a shot given in the trigger point to help relieve pain for a few days to a few months. Common places for trigger points include:  · The neck.  · The shoulders.  · The upper back.  · The lower back.  A trigger point injection will not cure long-term (chronic) pain permanently. These injections do not always work for every person. For some people, they can help to relieve pain for a few days to a few months.  Tell a health care provider about:  · Any allergies you have.  · All medicines you are taking, including vitamins, herbs, eye drops, creams, and over-the-counter medicines.  · Any problems you or family members have had with anesthetic medicines.  · Any blood disorders you have.  · Any surgeries you have had.  · Any medical conditions you have.  What are the risks?  Generally, this is a safe procedure. However, problems may occur, including:  · Infection.  · Bleeding or bruising.  · Allergic reaction to the injected medicine.  · Irritation of the skin around the injection site.  What happens before the procedure?  Ask your health care provider about:  · Changing or stopping your regular medicines. This is especially important if you are taking diabetes medicines or blood thinners.  · Taking medicines such as aspirin and ibuprofen. These medicines can thin your blood. Do not take these medicines unless your health care provider tells you to take them.  · Taking over-the-counter medicines, vitamins, herbs, and supplements.  What happens during the procedure?    · Your health care provider will feel for trigger points. A marker may be used to [place] the area for the injection.  · The skin over the trigger point will be washed with a germ-killing (antiseptic) solution.  · A thin needle is used for the injection. You may feel pain or a twitching feeling when the needle enters the trigger point.  · A numbing solution may  be injected into the trigger point. Sometimes a medicine to keep down inflammation is also injected.  · Your health care provider may move the needle around the area where the trigger point is located until the tightness and twitching goes away.  · After the injection, your health care provider may put gentle pressure over the injection site.  · The injection site will be covered with a bandage (dressing).  The procedure may vary among health care providers and hospitals.  What can I expect after treatment?  After treatment, you may have:  · Soreness and stiffness for 1-2 days.  · A dressing. This can be taken off in a few hours or as told by your health care provider.  Follow these instructions at home:  Injection site care  · Remove your dressing as told by your health care provider.  · Check your injection site every day for signs of infection. Check for:  ? Redness, swelling, or pain.  ? Fluid or blood.  ? Warmth.  ? Pus or a bad smell.  Managing pain, stiffness, and swelling  · If directed, put ice on the affected area.  ? Put ice in a plastic bag.  ? Place a towel between your skin and the bag.  ? Leave the ice on for 20 minutes, 2-3 times a day.  General instructions  · If you were asked to stop your regular medicines, ask your health care provider when you may start taking them again.  · Return to your normal activities as told by your health care provider. Ask your health care provider what activities are safe for you.  · Do not take baths, swim, or use a hot tub until your health care provider approves.  · You may be asked to see an occupational or physical therapist for exercises that reduce muscle strain and stretch the area of the trigger point.  · Keep all follow-up visits as told by your health care provider. This is important.  Contact a health care provider if:  · Your pain comes back, and it is worse than before the injection. You may need more injections.  · You have chills or a fever.  · The  injection site becomes more painful, red, swollen, or warm to the touch.  Summary  · A trigger point injection is a shot given in the trigger point to help relieve pain for a few days to a few months.  · Common places for trigger point injections are the neck, shoulder, upper back, and lower back.  · These injections do not always work for every person, but for some people, the injections can help to relieve pain for a few days to a few months.  · Contact a health care provider if symptoms come back or they are worse than before treatment. Also, get help if the injection site becomes more painful, red, swollen, or warm to the touch.  This information is not intended to replace advice given to you by your health care provider. Make sure you discuss any questions you have with your health care provider.  Document Revised: 01/29/2020 Document Reviewed: 01/29/2020  [employer] Patient Education © 2020 [employer]

## 2021-03-15 ENCOUNTER — TELEPHONE (OUTPATIENT)
Dept: PHYSICAL THERAPY | Facility: CLINIC | Age: 71
End: 2021-03-15

## 2021-03-22 ENCOUNTER — TELEPHONE (OUTPATIENT)
Dept: FAMILY MEDICINE CLINIC | Facility: CLINIC | Age: 71
End: 2021-03-22

## 2021-03-22 NOTE — TELEPHONE ENCOUNTER
Pt has taken risedronate (Actonel) 35 MG tablet the past 2 Fridays, however she no longer wishes to take it due to the symptoms it causes.     Since she has taken that medication she feels very weak, she was at the grocery store and she had to keep stopping to rest because she was so weak and she was not sure if she was going to make it through the entire store, She also has a salty taste in her mouth no matter what she eats and drink. She has no appetite. Please Advise.

## 2021-06-01 ENCOUNTER — OFFICE VISIT (OUTPATIENT)
Dept: FAMILY MEDICINE CLINIC | Facility: CLINIC | Age: 71
End: 2021-06-01

## 2021-06-01 VITALS
HEART RATE: 78 BPM | WEIGHT: 128 LBS | OXYGEN SATURATION: 98 % | BODY MASS INDEX: 21.85 KG/M2 | TEMPERATURE: 98.7 F | HEIGHT: 64 IN | DIASTOLIC BLOOD PRESSURE: 72 MMHG | SYSTOLIC BLOOD PRESSURE: 116 MMHG

## 2021-06-01 DIAGNOSIS — M50.30 DDD (DEGENERATIVE DISC DISEASE), CERVICAL: Primary | ICD-10-CM

## 2021-06-01 DIAGNOSIS — M47.814 THORACIC SPONDYLOSIS: ICD-10-CM

## 2021-06-01 DIAGNOSIS — M81.0 AGE-RELATED OSTEOPOROSIS WITHOUT CURRENT PATHOLOGICAL FRACTURE: ICD-10-CM

## 2021-06-01 PROCEDURE — 99214 OFFICE O/P EST MOD 30 MIN: CPT | Performed by: FAMILY MEDICINE

## 2021-06-01 RX ORDER — IBANDRONATE SODIUM 150 MG/1
150 TABLET, FILM COATED ORAL
Qty: 3 TABLET | Refills: 1 | Status: SHIPPED | OUTPATIENT
Start: 2021-06-01 | End: 2021-09-21 | Stop reason: SDUPTHER

## 2021-06-01 RX ORDER — TRAMADOL HYDROCHLORIDE 50 MG/1
50 TABLET ORAL EVERY 8 HOURS PRN
Qty: 75 TABLET | Refills: 2 | Status: SHIPPED | OUTPATIENT
Start: 2021-06-01 | End: 2021-09-21 | Stop reason: SDUPTHER

## 2021-06-01 NOTE — PATIENT INSTRUCTIONS
Continue current treatment plan.    Start Boniva 150 mg monthly  Increase strength/weightbearing exercise along with cardio exercise greater than 150 minutes weekly.  Also, continue calcium 1200 to 1500 mg daily, may take Os-Talha 2/day

## 2021-06-01 NOTE — PROGRESS NOTES
Subjective   Yeimi Larson is a 70 y.o. female.     Vitals:    06/01/21 1116   BP: 116/72   Pulse: 78   Temp: 98.7 °F (37.1 °C)   SpO2: 98%        Chief Complaint   Patient presents with   • Back Pain     follow up back pain no complains         History of Present Illness    3-month follow-up on chronic cervical DDD, thoracic DDD, and osteoporosis    LOV with me in March for med refills.  A few changes were made at that visit --- Norco was changed back to tramadol (due to intolerance to Norco and the fact that her cervical and thoracic DDD were under much better control.)  Plans were for her to start cervical injections; however, she decided to hold on any type of injections due to doing much better overall.  Also, Prolia was changed to Actonel due to denial by insurance company --however, unfortunately she had some GI side effects with Actonel and is no longer taking this medication either.  Does not recall trying any other osteoporosis type of medication.    Overall, doing much better!  Currently, taking tramadol 2/day and may need an extra dose 1-2 times per week when having to do more outdoor/yard work.  Compliant with and tolerates all medications without side effects except for the Actonel.  Last urine tox screen for compliance was in August 2020 and consistent with prescription history.  Elliot reviewed by me today shows last refill for tramadol quantity of 90 on 3/2/2021.  Very low risk patient behavior.    The following portions of the patient's history were reviewed and updated as appropriate: allergies, current medications, past family history, past medical history, past social history, past surgical history and problem list.    Review of Systems   Constitutional: Negative for unexpected weight gain and unexpected weight loss.   Respiratory: Negative for shortness of breath.    Cardiovascular: Negative for chest pain.       Objective   Physical Exam  Vitals and nursing note reviewed.   Constitutional:        Appearance: Normal appearance. She is well-developed and normal weight.   HENT:      Head: Normocephalic and atraumatic.      Nose: Nose normal.   Eyes:      Conjunctiva/sclera: Conjunctivae normal.      Pupils: Pupils are equal, round, and reactive to light.   Neck:      Thyroid: No thyromegaly.   Cardiovascular:      Rate and Rhythm: Normal rate and regular rhythm.      Heart sounds: Normal heart sounds. No murmur heard.     Pulmonary:      Effort: Pulmonary effort is normal.      Breath sounds: Normal breath sounds.   Abdominal:      General: Abdomen is flat. Bowel sounds are normal. There is no distension.      Palpations: Abdomen is soft. There is no hepatomegaly, splenomegaly or mass.      Tenderness: There is no abdominal tenderness. There is no guarding or rebound.      Hernia: No hernia is present.   Musculoskeletal:         General: Normal range of motion.      Cervical back: Normal range of motion and neck supple.      Right lower leg: No edema.      Left lower leg: No edema.   Lymphadenopathy:      Cervical: No cervical adenopathy.   Skin:     General: Skin is warm.   Neurological:      General: No focal deficit present.      Mental Status: She is alert.   Psychiatric:         Mood and Affect: Mood normal.         Behavior: Behavior normal.         Thought Content: Thought content normal.         Judgment: Judgment normal.          LABS/STUDIES  -August 2020 --CBC, CMP, lipids all WNL    Procedures     Assessment/Plan   Diagnoses and all orders for this visit:    1. DDD (degenerative disc disease), cervical (Primary)  -stable.  Urine Tox screen/August and Elliot both reviewed, appropriate.  No change with prescription.  Continue Mobic 15 mg 1 p.o. daily as needed, renal function WNL.  Will refill upon request  Also, may continue Ultram as prescribed below, quantity 75 per 30 days.    2. Thoracic spondylosis  -stable.  Same plan as above.  -     traMADol (ULTRAM) 50 MG tablet; Take 1 tablet by mouth  Every 8 (Eight) Hours As Needed for Moderate Pain .  Dispense: 75 tablet; Refill: 2    3. Age-related osteoporosis without current pathological fracture  -intolerant to Actonel, Prolia was denied by insurance.  Start Boniva 150 mg 1 p.o. every 4 weeks  If still with side effects then may need to try to obtain a PA for Prolia?  Increase strength/weightbearing exercise along with cardio exercise greater than 150 minutes weekly.  Also, continue calcium 1200 to 1500 mg daily, may take Os-Talha 2/day    Other orders  -     ibandronate (Boniva) 150 MG tablet; Take 1 tablet by mouth Every 30 (Thirty) Days.  Dispense: 3 tablet; Refill: 1                 Wore goggles and face mask during entire visit.    Return in about 3 months (around 9/1/2021) for Recheck.

## 2021-07-28 RX ORDER — MELOXICAM 15 MG/1
TABLET ORAL
Qty: 90 TABLET | Refills: 1 | Status: SHIPPED | OUTPATIENT
Start: 2021-07-28 | End: 2021-09-21 | Stop reason: SDUPTHER

## 2021-09-13 RX ORDER — ESCITALOPRAM OXALATE 20 MG/1
TABLET ORAL
Qty: 90 TABLET | Refills: 0 | Status: SHIPPED | OUTPATIENT
Start: 2021-09-13 | End: 2021-09-21 | Stop reason: SDUPTHER

## 2021-09-21 ENCOUNTER — OFFICE VISIT (OUTPATIENT)
Dept: FAMILY MEDICINE CLINIC | Facility: CLINIC | Age: 71
End: 2021-09-21

## 2021-09-21 VITALS
DIASTOLIC BLOOD PRESSURE: 70 MMHG | BODY MASS INDEX: 21.58 KG/M2 | OXYGEN SATURATION: 99 % | WEIGHT: 126.4 LBS | TEMPERATURE: 97.9 F | HEART RATE: 77 BPM | SYSTOLIC BLOOD PRESSURE: 126 MMHG | HEIGHT: 64 IN

## 2021-09-21 DIAGNOSIS — M47.814 THORACIC SPONDYLOSIS: Primary | ICD-10-CM

## 2021-09-21 DIAGNOSIS — M79.671 PAIN IN BOTH FEET: ICD-10-CM

## 2021-09-21 DIAGNOSIS — M79.672 PAIN IN BOTH FEET: ICD-10-CM

## 2021-09-21 DIAGNOSIS — Z79.899 HIGH RISK MEDICATION USE: ICD-10-CM

## 2021-09-21 DIAGNOSIS — M81.0 AGE-RELATED OSTEOPOROSIS WITHOUT CURRENT PATHOLOGICAL FRACTURE: ICD-10-CM

## 2021-09-21 DIAGNOSIS — F33.42 RECURRENT MAJOR DEPRESSIVE DISORDER, IN FULL REMISSION (HCC): ICD-10-CM

## 2021-09-21 DIAGNOSIS — R25.1 TREMOR: ICD-10-CM

## 2021-09-21 PROBLEM — E78.2 MIXED HYPERLIPIDEMIA: Status: RESOLVED | Noted: 2020-04-15 | Resolved: 2021-09-21

## 2021-09-21 PROCEDURE — 99214 OFFICE O/P EST MOD 30 MIN: CPT | Performed by: FAMILY MEDICINE

## 2021-09-21 RX ORDER — ESCITALOPRAM OXALATE 20 MG/1
20 TABLET ORAL DAILY
Qty: 90 TABLET | Refills: 1 | Status: SHIPPED | OUTPATIENT
Start: 2021-09-21 | End: 2022-01-11 | Stop reason: SDUPTHER

## 2021-09-21 RX ORDER — IBANDRONATE SODIUM 150 MG/1
150 TABLET, FILM COATED ORAL
Qty: 3 TABLET | Refills: 1 | Status: SHIPPED | OUTPATIENT
Start: 2021-09-21 | End: 2021-11-09

## 2021-09-21 RX ORDER — MELOXICAM 15 MG/1
15 TABLET ORAL DAILY
Qty: 90 TABLET | Refills: 1 | Status: SHIPPED | OUTPATIENT
Start: 2021-09-21 | End: 2022-01-11 | Stop reason: SDUPTHER

## 2021-09-21 RX ORDER — TRAMADOL HYDROCHLORIDE 50 MG/1
50 TABLET ORAL EVERY 8 HOURS PRN
Qty: 75 TABLET | Refills: 2 | Status: SHIPPED | OUTPATIENT
Start: 2021-10-03 | End: 2021-10-02

## 2021-09-21 NOTE — PROGRESS NOTES
"Subjective   Yeimi Larson is a 70 y.o. female.     Vitals:    09/21/21 1027   BP: 126/70   Pulse: 77   Temp: 97.9 °F (36.6 °C)   SpO2: 99%        Chief Complaint   Patient presents with   • Back Pain     DDD CERVICAL SPINE   • FEET PAIN     RECENT PAINTING WENT UP ANFD DOWN LADDER ALOT   • Tremors     DAUGHTER INSISTED SHE ASK ABOUT HER SHAKING        History of Present Illness    3-month follow-up for chronic thoracic and cervical DDD  And  6-month follow-up for [facility] with depression, osteoporosis, and complaints of tremors and bilateral foot pain    LOV with me in June for chronic med refills.  At that visit, the only medication change was--the addition of Boniva for her osteoporosis--she has been compliant with this and tolerating without side effects.    Overall, doing okay all considering.  Still dealing with lots of stresses regarding her 's health.  However, she does have a few complaints today.    --Complains of the bottom of both her feet hurting x approximately 4 weeks now. She states this first started after doing a lot of painting, where she was going up and down a stepladder while wearing old/nonsupportive shoes (due to not wanting to get pain on them.)  The discomfort is improving, \"so much better than it was.\"   No radiating pain into her extremities. She does take Mobic daily.    --Also, her daughter has expressed concern regarding her tremor/shaking.  The tremor primarily occurs when reaching for an object, slight tremor. Occasionally at rest.  Uncertain about family history regarding familial tremor? No family history of Parkinson's. No falls. No memory concerns.  She has been under more stresses with her 's health. Minimal caffeine use, no changes.    Otherwise, just needs several refills today.  Compliant with and tolerates all medications without side effects.  Still takes Mobic daily and Ultram approximately 1 to 3/day for her chronic cervical and thoracic DDD.  Last Urine Tox " screen for compliance was last August 2020 and consistent with prescription history.  Elliot report reviewed by me today shows last refill for Ultram a quantity of 75 on 9/4/2021.    The following portions of the patient's history were reviewed and updated as appropriate: allergies, current medications, past family history, past medical history, past social history, past surgical history and problem list.    Review of Systems   Constitutional: Negative for fever, unexpected weight gain and unexpected weight loss.   Respiratory: Negative for cough and shortness of breath.    Cardiovascular: Negative for chest pain.   Neurological: Positive for tremors.       Objective   Physical Exam  Vitals and nursing note reviewed.   Constitutional:       Appearance: Normal appearance. She is well-developed and normal weight.   HENT:      Head: Normocephalic and atraumatic.      Nose: Nose normal.   Eyes:      Conjunctiva/sclera: Conjunctivae normal.      Pupils: Pupils are equal, round, and reactive to light.   Neck:      Thyroid: No thyromegaly.   Cardiovascular:      Rate and Rhythm: Normal rate and regular rhythm.      Heart sounds: Normal heart sounds. No murmur heard.     Pulmonary:      Effort: Pulmonary effort is normal.      Breath sounds: Normal breath sounds.   Abdominal:      General: Abdomen is flat. Bowel sounds are normal. There is no distension.      Palpations: Abdomen is soft. There is no hepatomegaly, splenomegaly or mass.      Tenderness: There is no abdominal tenderness. There is no guarding or rebound.      Hernia: No hernia is present.   Musculoskeletal:         General: Normal range of motion.      Cervical back: Normal range of motion and neck supple.      Right lower leg: No edema.      Left lower leg: No edema.      Right foot: Normal range of motion. No deformity, bunion or prominent metatarsal heads.      Left foot: Normal range of motion. No deformity, bunion or prominent metatarsal heads.   Feet:       Right foot:      Skin integrity: Skin integrity normal.      Left foot:      Skin integrity: Skin integrity normal.      Comments: Bilateral feet --slight tenderness noted along the entire plantar surface bilateral feet, including metatarsals  Lymphadenopathy:      Cervical: No cervical adenopathy.   Skin:     General: Skin is warm.   Neurological:      General: No focal deficit present.      Mental Status: She is alert.      Motor: Motor function is intact.      Coordination: Coordination is intact.      Gait: Gait is intact.      Comments: No cogwheel rigidity  No masked facies    Slight resting tremor only   Psychiatric:         Mood and Affect: Mood normal.         Behavior: Behavior normal.         Thought Content: Thought content normal.         Judgment: Judgment normal.          LABS/STUDIES  -August 2020 --labs all within normal limits  DEXA last done in December 2020    Procedures     Assessment/Plan   Diagnoses and all orders for this visit:    1. Thoracic spondylosis (Primary)  -controlled.  Update Urine Tox screen for compliance today, very low risk patient behavior. Elliot report reviewed, appropriate. No change of medication.  Continue Mobic 15 mg 1 p.o. daily, BMP up-to-date and normal  Continue/refill Ultram as directed below, quantity 75 per 30 days, next refill due 10/3/2021  -     traMADol (ULTRAM) 50 MG tablet; Take 1 tablet by mouth Every 8 (Eight) Hours As Needed for Moderate Pain .  Dispense: 75 tablet; Refill: 2  -     ToxASSURE Select 13 Discrete -    2. 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  No signs of Parkinson's. Reassurance given.  We will continue to monitor at this point.    3. Pain in both feet  -resolving.  Most likely this was due to overuse and nonsupportive shoes.  Continue with conservative treatment --time and Mobic.    4. Recurrent major depressive disorder, in full remission (CMS/[facility])  -fair control. No change of medication at  this time.  Continue/refill Lexapro 20 mg daily    5. Age-related osteoporosis without current pathological fracture  -stable.  Continue Boniva 150 mg 1 p.o. q. 30 days  Due to recheck DEXA in January 2022    6. High risk medication use  -     ToxASSURE Select 13 Discrete -    Other orders  -     escitalopram (LEXAPRO) 20 MG tablet; Take 1 tablet by mouth Daily.  Dispense: 90 tablet; Refill: 1  -     meloxicam (MOBIC) 15 MG tablet; Take 1 tablet by mouth Daily.  Dispense: 90 tablet; Refill: 1  -     ibandronate (Boniva) 150 MG tablet; Take 1 tablet by mouth Every 30 (Thirty) Days.  Dispense: 3 tablet; Refill: 1  -     Cancel: TSH  -     Cancel: Basic Metabolic Panel                 Wore goggles and face mask during entire visit.    Return in about 3 months (around 12/21/2021) for Recheck, Medicare Wellness, January visit should be fine.

## 2021-09-29 LAB
6MAM UR QL CFM: NEGATIVE
6MAM/CREAT UR: NOT DETECTED NG/MG CREAT
7AMINOCLONAZEPAM/CREAT UR: NOT DETECTED NG/MG CREAT
A-OH ALPRAZ/CREAT UR: NOT DETECTED NG/MG CREAT
A-OH-TRIAZOLAM/CREAT UR CFM: NOT DETECTED NG/MG CREAT
ALFENTANIL/CREAT UR CFM: NOT DETECTED NG/MG CREAT
ALPHA-HYDROXYMIDAZOLAM, URINE: NOT DETECTED NG/MG CREAT
ALPRAZ/CREAT UR CFM: NOT DETECTED NG/MG CREAT
AMOBARBITAL UR QL CFM: NOT DETECTED
AMPHET/CREAT UR: NOT DETECTED NG/MG CREAT
AMPHETAMINES UR QL CFM: NEGATIVE
BARBITAL UR QL CFM: NOT DETECTED
BARBITURATES UR QL CFM: NEGATIVE
BENZODIAZ UR QL CFM: NEGATIVE
BUPRENORPHINE UR QL CFM: NEGATIVE
BUPRENORPHINE/CREAT UR: NOT DETECTED NG/MG CREAT
BUTABARBITAL UR QL CFM: NOT DETECTED
BUTALBITAL UR QL CFM: NOT DETECTED
BZE/CREAT UR: NOT DETECTED NG/MG CREAT
CANNABINOIDS UR QL CFM: NEGATIVE
CARBOXYTHC/CREAT UR: NOT DETECTED NG/MG CREAT
CLONAZEPAM/CREAT UR CFM: NOT DETECTED NG/MG CREAT
COCAETHYLENE/CREAT UR CFM: NOT DETECTED NG/MG CREAT
COCAINE UR QL CFM: NEGATIVE
COCAINE/CREAT UR CFM: NOT DETECTED NG/MG CREAT
CODEINE/CREAT UR: NOT DETECTED NG/MG CREAT
CREAT UR-MCNC: 320 MG/DL
DESALKYLFLURAZ/CREAT UR: NOT DETECTED NG/MG CREAT
DESMETHYLFLUNITRAZEPAM: NOT DETECTED NG/MG CREAT
DHC/CREAT UR: NOT DETECTED NG/MG CREAT
DIAZEPAM/CREAT UR: NOT DETECTED NG/MG CREAT
DRUGS UR: NORMAL
EDDP/CREAT UR: NOT DETECTED NG/MG CREAT
ETHANOL UR CFM-MCNC: NOT DETECTED G/DL
ETHANOL UR QL CFM: NEGATIVE
FENTANYL UR QL CFM: NEGATIVE
FENTANYL/CREAT UR: NOT DETECTED NG/MG CREAT
FLUNITRAZEPAM UR QL CFM: NOT DETECTED NG/MG CREAT
HYDROCODONE/CREAT UR: NOT DETECTED NG/MG CREAT
HYDROMORPHONE/CREAT UR: NOT DETECTED NG/MG CREAT
LEVEL OF DETECTION:: NORMAL
LORAZEPAM/CREAT UR: NOT DETECTED NG/MG CREAT
MDA/CREAT UR: NOT DETECTED NG/MG CREAT
MDMA/CREAT UR: NOT DETECTED NG/MG CREAT
MEPHOBARBITAL UR QL CFM: NOT DETECTED
METHADONE UR QL CFM: NEGATIVE
METHADONE/CREAT UR: NOT DETECTED NG/MG CREAT
METHAMPHET/CREAT UR: NOT DETECTED NG/MG CREAT
MIDAZOLAM/CREAT UR CFM: NOT DETECTED NG/MG CREAT
MORPHINE/CREAT UR: NOT DETECTED NG/MG CREAT
N-NORTRAMADOL/CREAT UR CFM: 1493 NG/MG CREAT
NARCOTICS UR: NORMAL
NORBUPRENORPHINE/CREAT UR: NOT DETECTED NG/MG CREAT
NORCODEINE/CREAT UR CFM: NOT DETECTED NG/MG CREAT
NORDIAZEPAM/CREAT UR: NOT DETECTED NG/MG CREAT
NORFENTANYL/CREAT UR: NOT DETECTED NG/MG CREAT
NORHYDROCODONE/CREAT UR: NOT DETECTED NG/MG CREAT
NORMORPHINE UR-MCNC: NOT DETECTED NG/MG CREAT
NOROXYCODONE/CREAT UR: NOT DETECTED NG/MG CREAT
NOROXYMORPHONE/CREAT UR CFM: NOT DETECTED NG/MG CREAT
O-NORTRAMADOL UR CFM-MCNC: 1342 NG/MG CREAT
OPIATES UR QL CFM: NEGATIVE
OXAZEPAM/CREAT UR: NOT DETECTED NG/MG CREAT
OXYCODONE UR QL CFM: NEGATIVE
OXYCODONE/CREAT UR: NOT DETECTED NG/MG CREAT
OXYMORPHONE/CREAT UR: NOT DETECTED NG/MG CREAT
PENTOBARB UR QL CFM: NOT DETECTED
PHENOBARB UR QL CFM: NOT DETECTED
SECOBARBITAL UR QL CFM: NOT DETECTED
SUFENTANIL/CREAT UR CFM: NOT DETECTED NG/MG CREAT
TAPENTADOL UR QL CFM: NEGATIVE
TAPENTADOL/CREAT UR: NOT DETECTED NG/MG CREAT
TEMAZEPAM/CREAT UR: NOT DETECTED NG/MG CREAT
THIOPENTAL UR QL CFM: NOT DETECTED
TRAMADOL UR QL CFM: 1084 NG/MG CREAT

## 2021-09-30 DIAGNOSIS — M47.814 THORACIC SPONDYLOSIS: ICD-10-CM

## 2021-10-02 RX ORDER — TRAMADOL HYDROCHLORIDE 50 MG/1
TABLET ORAL
Qty: 75 TABLET | Refills: 2 | Status: SHIPPED | OUTPATIENT
Start: 2021-10-02 | End: 2022-01-11 | Stop reason: SDUPTHER

## 2021-10-05 ENCOUNTER — TELEPHONE (OUTPATIENT)
Dept: FAMILY MEDICINE CLINIC | Facility: CLINIC | Age: 71
End: 2021-10-05

## 2021-10-05 DIAGNOSIS — R25.1 TREMOR: Primary | ICD-10-CM

## 2021-10-05 DIAGNOSIS — M81.0 AGE-RELATED OSTEOPOROSIS WITHOUT CURRENT PATHOLOGICAL FRACTURE: ICD-10-CM

## 2021-10-05 DIAGNOSIS — F33.42 RECURRENT MAJOR DEPRESSIVE DISORDER, IN FULL REMISSION (HCC): ICD-10-CM

## 2021-10-05 DIAGNOSIS — E78.2 MIXED HYPERLIPIDEMIA: ICD-10-CM

## 2021-10-05 DIAGNOSIS — Z79.899 HIGH RISK MEDICATION USE: ICD-10-CM

## 2021-10-05 DIAGNOSIS — M50.30 DDD (DEGENERATIVE DISC DISEASE), CERVICAL: ICD-10-CM

## 2021-10-05 NOTE — TELEPHONE ENCOUNTER
Patient called, she thought Dr. Cerna wanted her to have a lab, but nothing is in the notes or anything ordered.  Please advise if she should be scheduled for a lab before her January 11 appointment.

## 2021-11-09 RX ORDER — IBANDRONATE SODIUM 150 MG/1
TABLET, FILM COATED ORAL
Qty: 3 TABLET | Refills: 3 | Status: SHIPPED | OUTPATIENT
Start: 2021-11-09 | End: 2022-01-11 | Stop reason: SDUPTHER

## 2022-01-11 ENCOUNTER — OFFICE VISIT (OUTPATIENT)
Dept: FAMILY MEDICINE CLINIC | Facility: CLINIC | Age: 72
End: 2022-01-11

## 2022-01-11 VITALS
WEIGHT: 134 LBS | BODY MASS INDEX: 22.88 KG/M2 | HEIGHT: 64 IN | TEMPERATURE: 98.8 F | HEART RATE: 74 BPM | DIASTOLIC BLOOD PRESSURE: 72 MMHG | OXYGEN SATURATION: 98 % | SYSTOLIC BLOOD PRESSURE: 130 MMHG

## 2022-01-11 DIAGNOSIS — M47.814 THORACIC SPONDYLOSIS: ICD-10-CM

## 2022-01-11 DIAGNOSIS — F33.42 RECURRENT MAJOR DEPRESSIVE DISORDER, IN FULL REMISSION: ICD-10-CM

## 2022-01-11 DIAGNOSIS — M81.0 AGE-RELATED OSTEOPOROSIS WITHOUT CURRENT PATHOLOGICAL FRACTURE: ICD-10-CM

## 2022-01-11 DIAGNOSIS — M25.552 HIP PAIN, LEFT: ICD-10-CM

## 2022-01-11 DIAGNOSIS — Z00.00 WELLNESS EXAMINATION: Primary | ICD-10-CM

## 2022-01-11 DIAGNOSIS — Z12.31 ENCOUNTER FOR SCREENING MAMMOGRAM FOR BREAST CANCER: ICD-10-CM

## 2022-01-11 PROCEDURE — G0439 PPPS, SUBSEQ VISIT: HCPCS | Performed by: FAMILY MEDICINE

## 2022-01-11 PROCEDURE — 1159F MED LIST DOCD IN RCRD: CPT | Performed by: FAMILY MEDICINE

## 2022-01-11 PROCEDURE — 1170F FXNL STATUS ASSESSED: CPT | Performed by: FAMILY MEDICINE

## 2022-01-11 PROCEDURE — 99214 OFFICE O/P EST MOD 30 MIN: CPT | Performed by: FAMILY MEDICINE

## 2022-01-11 PROCEDURE — 96160 PT-FOCUSED HLTH RISK ASSMT: CPT | Performed by: FAMILY MEDICINE

## 2022-01-11 RX ORDER — MELOXICAM 15 MG/1
15 TABLET ORAL DAILY
Qty: 90 TABLET | Refills: 1 | Status: SHIPPED | OUTPATIENT
Start: 2022-01-11 | End: 2022-07-18

## 2022-01-11 RX ORDER — BUPROPION HYDROCHLORIDE 150 MG/1
150 TABLET ORAL DAILY
Qty: 30 TABLET | Refills: 2 | Status: SHIPPED | OUTPATIENT
Start: 2022-01-11 | End: 2022-03-22

## 2022-01-11 RX ORDER — ESCITALOPRAM OXALATE 20 MG/1
20 TABLET ORAL DAILY
Qty: 90 TABLET | Refills: 1 | Status: SHIPPED | OUTPATIENT
Start: 2022-01-11 | End: 2022-06-22

## 2022-01-11 RX ORDER — TRAMADOL HYDROCHLORIDE 50 MG/1
50 TABLET ORAL EVERY 8 HOURS PRN
Qty: 75 TABLET | Refills: 2 | Status: SHIPPED | OUTPATIENT
Start: 2022-01-25 | End: 2022-04-12 | Stop reason: SDUPTHER

## 2022-01-11 RX ORDER — IBANDRONATE SODIUM 150 MG/1
150 TABLET, FILM COATED ORAL
Qty: 3 TABLET | Refills: 3 | Status: SHIPPED | OUTPATIENT
Start: 2022-01-11 | End: 2022-11-29 | Stop reason: SDUPTHER

## 2022-01-11 RX ORDER — METHYLPREDNISOLONE 4 MG/1
TABLET ORAL
Qty: 1 EACH | Refills: 1 | Status: SHIPPED | OUTPATIENT
Start: 2022-01-11 | End: 2022-07-25

## 2022-01-11 NOTE — PATIENT INSTRUCTIONS
Start Medrol Dosepak as directed    Needs mammogram in February    Add Wellbutrin 150 mg daily, continue Lexapro as prescribed

## 2022-01-11 NOTE — PROGRESS NOTES
The ABCs of the Annual Wellness Visit  Subsequent Medicare Wellness Visit    Chief Complaint   Patient presents with   • Medicare Wellness-subsequent     YEARLY WELLNESS HAD LABS LAST WEEK UTD ON MAMMOGRAM AND COLONOSCOPY   • Hip Pain     LEFT HIP FLARED UP AGAIN   • Depression   • Med Refill       Subjective   History of Present Illness:  Yeimi Larson is a 71 y.o. female who presents for a Subsequent Medicare Wellness Visit.    Presents for annual wellness  And  3-month follow-up on chronic thoracic DDD, cervical DDD, osteoporosis, and complaints of left hip pain x1 week and more depressed    LOV with me in September for chronic med refills only.  No changes made at that visit as all was stable.    Overall, doing okay all considering lots of caregiver stress.  However, she does have a few complaints today.  -- Complains of increasing left hip pain x1 week.  No known injury.  She does have a history of cervical and thoracic DDD.  Approximately 1 year ago she did have an acute episode of significant low back pain with left-sided sciatica.  This was treated conservatively and recovered uneventfully.  Now she is experiencing more pain in the left hip and groin only.  No radiation.  Denies low back pain.  Currently takes Mobic 15 mg daily for general OA and thoracic DDD.    --Also, having more issues with depression over the last 1 to 2 months.  She believes this is due to more caregiver stress/her 's health is declining.  She has been taking Lexapro 20 mg daily x10 years (ever since the death of her son.)  This was prescribed by her previous PCP.  She does maintain a regular exercise regimen, walks 7 days/week.  Sleep is okay.  Appetite stable.  Weight stable.    Otherwise, just needs chronic med refills today.  She had fasting lab work done last week.  Here for review.  Compliant with and tolerates all medications without side effects.  Takes Mobic every day and Ultram quantity of 2 to 3/day for significant  cervical and thoracic DDD.    Routine health maintenance/screening test:  BRADEN, benign reasons  Mammogram January 2021, normal  DEXA February 2021, osteoporosis  C-scope January 2021, normal  All vaccinations up-to-date  Maintains regular dental visits, dermatology visits, and ophthalmology visits.  Non-smoker, no alcohol use  Maintains a healthy well-balanced diet and regular cardio exercise, walks on a daily basis.  Family history negative for colon cancer, premature CAD, breast cancer    HEALTH RISK ASSESSMENT    Recent Hospitalizations:  No hospitalization(s) within the last year.    Current Medical Providers:  Patient Care Team:  Gregoria Cerna MD as PCP - General (Family Medicine)    Smoking Status:  Social History     Tobacco Use   Smoking Status Former Smoker   Smokeless Tobacco Never Used       Alcohol Consumption:  Social History     Substance and Sexual Activity   Alcohol Use No       Depression Screen:   PHQ-2/PHQ-9 Depression Screening 1/11/2022   Little interest or pleasure in doing things 0   Feeling down, depressed, or hopeless 1   Trouble falling or staying asleep, or sleeping too much -   Feeling tired or having little energy -   Poor appetite or overeating -   Feeling bad about yourself - or that you are a failure or have let yourself or your family down -   Trouble concentrating on things, such as reading the newspaper or watching television -   Moving or speaking so slowly that other people could have noticed. Or the opposite - being so fidgety or restless that you have been moving around a lot more than usual -   Thoughts that you would be better off dead, or of hurting yourself in some way -   Total Score 1   If you checked off any problems, how difficult have these problems made it for you to do your work, take care of things at home, or get along with other people? -       Fall Risk Screen:  STEADI Fall Risk Assessment was completed, and patient is at LOW risk for falls.Assessment  completed on:1/11/2022    Health Habits and Functional and Cognitive Screening:  Functional & Cognitive Status 1/11/2022   Do you have difficulty preparing food and eating? No   Do you have difficulty bathing yourself, getting dressed or grooming yourself? No   Do you have difficulty using the toilet? No   Do you have difficulty moving around from place to place? No   Do you have trouble with steps or getting out of a bed or a chair? No   Current Diet Well Balanced Diet   Dental Exam Not up to date   Eye Exam Up to date   Exercise (times per week) 7 times per week   Current Exercises Include Walking   Current Exercise Activities Include -   Do you need help using the phone?  No   Are you deaf or do you have serious difficulty hearing?  No   Do you need help with transportation? No   Do you need help shopping? No   Do you need help preparing meals?  No   Do you need help with housework?  No   Do you need help with laundry? No   Do you need help taking your medications? No   Do you need help managing money? No   Do you ever drive or ride in a car without wearing a seat belt? No   Have you felt unusual stress, anger or loneliness in the last month? -   Who do you live with? -   If you need help, do you have trouble finding someone available to you? -   Have you been bothered in the last four weeks by sexual problems? -   Do you have difficulty concentrating, remembering or making decisions? -         Does the patient have evidence of cognitive impairment? No    Asprin use counseling:Does not need ASA (and currently is not on it)    Age-appropriate Screening Schedule:  Refer to the list below for future screening recommendations based on patient's age, sex and/or medical conditions. Orders for these recommended tests are listed in the plan section. The patient has been provided with a written plan.    Health Maintenance   Topic Date Due   • LIPID PANEL  01/03/2023   • MAMMOGRAM  01/30/2023   • DXA SCAN  02/05/2023   •  TDAP/TD VACCINES (2 - Td or Tdap) 04/09/2029   • INFLUENZA VACCINE  Completed   • ZOSTER VACCINE  Completed          The following portions of the patient's history were reviewed and updated as appropriate: allergies, current medications, past family history, past medical history, past social history, past surgical history and problem list.    Outpatient Medications Prior to Visit   Medication Sig Dispense Refill   • cholecalciferol (VITAMIN D3) 1000 units tablet Take 1,000 Units by mouth Daily.     • escitalopram (LEXAPRO) 20 MG tablet Take 1 tablet by mouth Daily. 90 tablet 1   • ibandronate (BONIVA) 150 MG tablet TAKE 1 TABLET BY MOUTH EVERY 30 DAYS 3 tablet 3   • meloxicam (MOBIC) 15 MG tablet Take 1 tablet by mouth Daily. 90 tablet 1   • traMADol (ULTRAM) 50 MG tablet TAKE 1 TABLET BY MOUTH EVERY 8 HOURS AS NEEDED FOR MODERATE PAIN 75 tablet 2   • cyclobenzaprine (FLEXERIL) 10 MG tablet Take 1 tablet by mouth 3 (Three) Times a Day As Needed for Muscle Spasms. 60 tablet 1     No facility-administered medications prior to visit.       Patient Active Problem List   Diagnosis   • Thoracic spondylosis   • Recurrent major depressive disorder, in full remission (HCC)   • Age-related osteoporosis without current pathological fracture   • High risk medication use   • History of diverticulosis   • Positive colorectal cancer screening using DNA-based stool test   • DDD (degenerative disc disease), cervical   • Acute left-sided low back pain with left-sided sciatica   • Tremor       Advanced Care Planning:  ACP discussion was held with the patient during this visit. Patient has an advance directive in EMR which is still valid.     Review of Systems   Constitutional: Negative for fatigue, fever and unexpected weight change.   Respiratory: Negative for cough and shortness of breath.    Cardiovascular: Negative for chest pain.       Compared to one year ago, the patient feels her physical health is the same.  Compared to one  "year ago, the patient feels her mental health is worse.    Reviewed chart for potential of high risk medication in the elderly: yes  Reviewed chart for potential of harmful drug interactions in the elderly:yes    Objective         Vitals:    01/11/22 1441   BP: 130/72   Pulse: 74   Temp: 98.8 °F (37.1 °C)   SpO2: 98%   Weight: 60.8 kg (134 lb)   Height: 162.6 cm (64\")       Body mass index is 23 kg/m².  Discussed the patient's BMI with her. The BMI is in the acceptable range.    Physical Exam  Musculoskeletal:      Comments: Left hip --tender in joint line, groin, tender with abduction and abduction  Low back, nontender, negative straight leg raise         Lab Results   Component Value Date    CHLPL 213 (H) 01/03/2022    TRIG 108 01/03/2022    HDL 62 01/03/2022     (H) 01/03/2022    VLDL 19 01/03/2022 January 2022 --CMP, CBC, TSH all normal    Procedures       Assessment/Plan   Medicare Risks and Personalized Health Plan  CMS Preventative Services Quick Reference  Breast Cancer/Mammogram Screening  Chronic Pain   Depression/Dysphoria  Fall Risk  Glaucoma Risk  Osteoporosis Risk    The above risks/problems have been discussed with the patient.  Pertinent information has been shared with the patient in the After Visit Summary.  Follow up plans and orders are seen below in the Assessment/Plan Section.    Diagnoses and all orders for this visit:    1. Wellness examination (Primary)  -S/P subsequent MC exam done, WNL  All screening up-to-date except for needs mammogram, due next month.  See orders below.  Otherwise, continue with healthy lifestyle -Needs low-carb/low calorie/low cholesterol diet and increase cardio exercise to greater than 150 minutes weekly    2. Encounter for screening mammogram for breast cancer   -     Mammo Screening Bilateral With CAD; Future    3. Recurrent major depressive disorder, in full remission (HCC)  --slightly uncontrolled due to caregiver stress  Recommend adding Wellbutrin XL " 150 mg daily.  No seizure history.  Benefits and risks have been discussed.  Continue Lexapro 20 mg daily, refilled today.  Continue with regular routine exercise, may consider counseling and support groups?    4. Hip pain, left  -new Dx/uncontrolled, symptoms present x1 week  Strongly suspect OA/DDD changes.    Try conservative treatment first.  May increase Mobic to twice daily x7 to 10 days.  Start Medrol Dosepak as directed  May use her Ultram as needed.  If not better then needs to follow-up in 2 to 4 weeks for an x-ray and further evaluation    5. Thoracic spondylosis  -stable.  Urine Tox screen/September 2021 and Elliot both reviewed, appropriate.  No change of medication.  Continue/refill Mobic 15 mg daily as needed  Continue/refill Ultram as directed below, next refill due 1/25/2022  -     traMADol (ULTRAM) 50 MG tablet; Take 1 tablet by mouth Every 8 (Eight) Hours As Needed for Moderate Pain .  Dispense: 75 tablet; Refill: 2    6. Age-related osteoporosis without current pathological fracture  -stable  Refill Boniva 150 mg every 30 days.  Next DEXA due February 2023  Increase strength/weightbearing exercise along with cardio exercise greater than 150 minutes weekly.  Also, continue calcium 1200 to 1500 mg daily, may take Os-Talha 2/day    Other orders  -     ibandronate (BONIVA) 150 MG tablet; Take 1 tablet by mouth Every 30 (Thirty) Days.  Dispense: 3 tablet; Refill: 3  -     escitalopram (LEXAPRO) 20 MG tablet; Take 1 tablet by mouth Daily.  Dispense: 90 tablet; Refill: 1  -     meloxicam (MOBIC) 15 MG tablet; Take 1 tablet by mouth Daily.  Dispense: 90 tablet; Refill: 1  -     methylPREDNISolone (MEDROL) 4 MG dose pack; Take as directed on package instructions.  Dispense: 1 each; Refill: 1  -     buPROPion XL (WELLBUTRIN XL) 150 MG 24 hr tablet; Take 1 tablet by mouth Daily.  Dispense: 30 tablet; Refill: 2      Follow Up:  Return in about 3 months (around 4/11/2022) for Recheck.     An After Visit Summary  and PPPS were given to the patient.  Wore goggles and face mask during entire visit

## 2022-02-03 ENCOUNTER — APPOINTMENT (OUTPATIENT)
Dept: MAMMOGRAPHY | Facility: HOSPITAL | Age: 72
End: 2022-02-03

## 2022-02-24 ENCOUNTER — HOSPITAL ENCOUNTER (OUTPATIENT)
Dept: MAMMOGRAPHY | Facility: HOSPITAL | Age: 72
Discharge: HOME | End: 2022-02-24
Admitting: FAMILY MEDICINE

## 2022-02-24 DIAGNOSIS — Z12.31 ENCOUNTER FOR SCREENING MAMMOGRAM FOR BREAST CANCER: ICD-10-CM

## 2022-02-24 PROCEDURE — 77067 SCR MAMMO BI INCL CAD: CPT

## 2022-02-24 PROCEDURE — 77063 BREAST TOMOSYNTHESIS BI: CPT

## 2022-03-22 RX ORDER — BUPROPION HYDROCHLORIDE 150 MG/1
150 TABLET ORAL DAILY
Qty: 30 TABLET | Refills: 2 | Status: SHIPPED | OUTPATIENT
Start: 2022-03-22 | End: 2022-04-12

## 2022-04-12 ENCOUNTER — OFFICE VISIT (OUTPATIENT)
Dept: FAMILY MEDICINE CLINIC | Facility: CLINIC | Age: 72
End: 2022-04-12

## 2022-04-12 VITALS
DIASTOLIC BLOOD PRESSURE: 70 MMHG | BODY MASS INDEX: 23.32 KG/M2 | OXYGEN SATURATION: 95 % | TEMPERATURE: 97.3 F | WEIGHT: 136.6 LBS | HEIGHT: 64 IN | SYSTOLIC BLOOD PRESSURE: 130 MMHG | HEART RATE: 74 BPM

## 2022-04-12 DIAGNOSIS — M25.532 LEFT WRIST PAIN: ICD-10-CM

## 2022-04-12 DIAGNOSIS — F33.0 MILD EPISODE OF RECURRENT MAJOR DEPRESSIVE DISORDER: Primary | ICD-10-CM

## 2022-04-12 DIAGNOSIS — M47.814 THORACIC SPONDYLOSIS: ICD-10-CM

## 2022-04-12 DIAGNOSIS — M25.50 ARTHRALGIA, UNSPECIFIED JOINT: ICD-10-CM

## 2022-04-12 PROCEDURE — 99214 OFFICE O/P EST MOD 30 MIN: CPT | Performed by: FAMILY MEDICINE

## 2022-04-12 RX ORDER — TRAMADOL HYDROCHLORIDE 50 MG/1
50 TABLET ORAL EVERY 8 HOURS PRN
Qty: 75 TABLET | Refills: 2 | Status: SHIPPED | OUTPATIENT
Start: 2022-04-20 | End: 2022-07-25 | Stop reason: SDUPTHER

## 2022-04-12 RX ORDER — THIAMINE HCL 100 MG
2500 TABLET ORAL DAILY
COMMUNITY

## 2022-04-12 NOTE — PROGRESS NOTES
"Chief Complaint  THORACIC SPONDYLOSIS (MEDICATION REVIEW AND REFILL), Depression (FOLLOW UP COULD NOT TAKE WELLBUTRIN TRIED X 2 MONTHS MADE SO JITTERY), and Wrist Pain (LEFT WRIST \"NERVE PAIN\" AREA ON BONE THAT HURTS WHEN ANYTHING TOUCHES IT)     3-month follow-up on depression, thoracic spondylosis, multiple arthralgias, and now with complaints of left wrist pain    LOV with me in January for wellness exam, uncontrolled depression, and left hip arthralgias  --Screening tests were updated, all WNL.  Labs all WNL.  --Wellbutrin 150 mg was added to her Lexapro for uncontrolled mild depression secondary to caregiver stress  --Treated with a Medrol Dosepak for flareup of arthralgia/left hip pain    Overall, doing better.  She was compliant with all the above recommendations; however, could not tolerate Wellbutrin as it made her \"jittery.\"  Only taking her Lexapro 20 mg at this time for her mild depression/caregiver stress, which seems to be working okay.  Things are more stable at this point.  Prefers not to make any adjustments with medication right now.  Great family support.  Sleeping fine.  Weight stable.    Today, she complains of left wrist pain.  She describes this more as a \"nerve sensitivity\" on the bony prominence of her left wrist.  She states this has been present for many years and only hurts when something touches it.  No injury.  Currently takes Mobic for multiple arthralgias.  Family history negative for RA, connective tissue disorders.    Otherwise, just needs chronic med refills.  Compliant with and tolerates all medications without side effects.  Still takes Mobic daily and Ultram quantity of 2 to 3/day for thoracic spondylosis, cervical and lumbar DDD.  Failed injections.    Review of Systems   Constitutional: Negative for fever and unexpected weight change.   Respiratory: Negative for cough and shortness of breath.    Cardiovascular: Negative for chest pain.        Subjective          Yeimi RENAY Larson " "presents to [facility]    Objective   Vital Signs:   Vitals:    04/12/22 1106   BP: 130/70   BP Location: Right arm   Patient Position: Sitting   Cuff Size: Adult   Pulse: 74   Temp: 97.3 °F (36.3 °C)   SpO2: 95%   Weight: 62 kg (136 lb 9.6 oz)   Height: 162.6 cm (64\")      Physical Exam  Vitals and nursing note reviewed.   Constitutional:       Appearance: Normal appearance. She is well-developed.   HENT:      Head: Normocephalic and atraumatic.      Nose: Nose normal.   Eyes:      Conjunctiva/sclera: Conjunctivae normal.      Pupils: Pupils are equal, round, and reactive to light.   Neck:      Thyroid: No thyromegaly.   Cardiovascular:      Rate and Rhythm: Normal rate and regular rhythm.      Heart sounds: Normal heart sounds. No murmur heard.  Pulmonary:      Effort: Pulmonary effort is normal.      Breath sounds: Normal breath sounds.   Abdominal:      General: Abdomen is flat. Bowel sounds are normal. There is no distension.      Palpations: Abdomen is soft. There is no hepatomegaly, splenomegaly or mass.      Tenderness: There is no abdominal tenderness. There is no guarding or rebound.      Hernia: No hernia is present.   Musculoskeletal:         General: Normal range of motion.      Cervical back: Normal range of motion and neck supple.      Right lower leg: No edema.      Left lower leg: No edema.      Comments: Left wrist --symmetric joints, no abnormal bony prominence, mild tenderness to touch at left ulnar prominence.  Full range of motion, normal strength   Lymphadenopathy:      Cervical: No cervical adenopathy.   Skin:     General: Skin is warm.   Neurological:      General: No focal deficit present.      Mental Status: She is alert.   Psychiatric:         Mood and Affect: Mood normal.         Behavior: Behavior normal.         Thought Content: Thought content normal.         Judgment: Judgment normal.        Result Review :     Common labs    Common Labsle 1/3/22 1/3/22 " 1/3/22    0920 0920 0920   Glucose 90     BUN 18     Creatinine 0.92     eGFR Non  Am 62     eGFR  Am 71     Sodium 142     Potassium 4.9     Chloride 103     Calcium 8.8     Total Protein 7.0     Albumin 4.0     Total Bilirubin 0.4     Alkaline Phosphatase 102     AST (SGOT) 15     ALT (SGPT) 6     WBC   5.4   Hemoglobin   12.7   Hematocrit   38.5   Platelets   296   Total Cholesterol  213 (A)    Triglycerides  108    HDL Cholesterol  62    LDL Cholesterol   132 (A)    (A) Abnormal value       Comments are available for some flowsheets but are not being displayed.           TSH    TSH 1/3/22   TSH 2.750             No results found for: ANADIRECT, FERRITIN, TESTOSTEROTT, [id number], FOLATE, RF, BNP            Assessment and Plan    Diagnoses and all orders for this visit:    1. Mild episode of recurrent major depressive disorder (HCC) (Primary)  -controlled  Caregiver stress stable at this point.  Thus, no change of medications for now.  Continue Lexapro 20 mg 1 p.o. daily, will refill upon request.    2. Thoracic spondylosis  -controlled  Urine Tox screen/September and VIP[facility] report both reviewed, appropriate.  No change of medication  Continue/refill Ultram as directed below, next refill due on 4/20/2022  -     traMADol (ULTRAM) 50 MG tablet; Take 1 tablet by mouth Every 8 (Eight) Hours As Needed for Moderate Pain .  Dispense: 75 tablet; Refill: 2    3. Arthralgia, unspecified joint  -uncontrolled  Given multiple arthralgias, will check for RA.  Order rheumatoid factor.  May continue Mobic 15 mg 1 p.o. daily, will refill upon request.  Normal renal function.  -     Rheumatoid Factor    4. Left wrist pain  -more consistent with a neuritis?  Continue Mobic as prescribed  Rule out RA.  -     Rheumatoid Factor        Follow Up   Return in about 3 months (around 7/12/2022) for Recheck.  Patient was given instructions and counseling regarding her condition or for health maintenance advice. Please see  specific information pulled into the AVS if appropriate.

## 2022-04-13 LAB — RHEUMATOID FACT SERPL-ACNC: <10 IU/ML

## 2022-06-22 RX ORDER — ESCITALOPRAM OXALATE 20 MG/1
TABLET ORAL
Qty: 90 TABLET | Refills: 1 | Status: SHIPPED | OUTPATIENT
Start: 2022-06-22 | End: 2022-11-29 | Stop reason: SDUPTHER

## 2022-07-18 RX ORDER — MELOXICAM 15 MG/1
TABLET ORAL
Qty: 90 TABLET | Refills: 1 | Status: SHIPPED | OUTPATIENT
Start: 2022-07-18 | End: 2022-11-29 | Stop reason: SDUPTHER

## 2022-07-25 ENCOUNTER — OFFICE VISIT (OUTPATIENT)
Dept: FAMILY MEDICINE CLINIC | Facility: CLINIC | Age: 72
End: 2022-07-25

## 2022-07-25 VITALS
OXYGEN SATURATION: 97 % | SYSTOLIC BLOOD PRESSURE: 120 MMHG | DIASTOLIC BLOOD PRESSURE: 78 MMHG | HEART RATE: 76 BPM | WEIGHT: 135.8 LBS | BODY MASS INDEX: 23.18 KG/M2 | TEMPERATURE: 97.7 F | HEIGHT: 64 IN

## 2022-07-25 DIAGNOSIS — M47.814 THORACIC SPONDYLOSIS: Primary | ICD-10-CM

## 2022-07-25 DIAGNOSIS — Z79.899 HIGH RISK MEDICATION USE: ICD-10-CM

## 2022-07-25 PROBLEM — Z63.6 CAREGIVER STRESS: Status: ACTIVE | Noted: 2022-07-25

## 2022-07-25 PROBLEM — M19.90 OSTEOARTHRITIS (ARTHRITIS DUE TO WEAR AND TEAR OF JOINTS): Status: ACTIVE | Noted: 2022-07-25

## 2022-07-25 PROCEDURE — 99213 OFFICE O/P EST LOW 20 MIN: CPT | Performed by: FAMILY MEDICINE

## 2022-07-25 RX ORDER — TRAMADOL HYDROCHLORIDE 50 MG/1
50 TABLET ORAL EVERY 8 HOURS PRN
Qty: 75 TABLET | Refills: 2 | Status: SHIPPED | OUTPATIENT
Start: 2022-08-01 | End: 2022-11-29 | Stop reason: SDUPTHER

## 2022-07-25 NOTE — PROGRESS NOTES
"Chief Complaint  Back Pain (Thoracic spondylosis medication review and refills)     3 to 4-month follow-up for chronic thoracic spondylosis/med refills    LOV with me in April for chronic med refills.  No med changes made at that visit, however a rheumatoid factor level was drawn to rule out possible RA.  All negative.    Overall, continues to do well.  Maintains a very active lifestyle, enjoys walking.  Weight stable.  Mood good.  Caregiver stress has been manageable.    No particular complaints or concerns today.  Compliant with and tolerates all medications without side effects.  Still takes her Mobic on a daily basis and the Ultram 2 in a.m. and then may take an extra 1 prn during the day if needed.    Review of Systems   Constitutional: Negative for fever and unexpected weight change.   Respiratory: Negative for cough and shortness of breath.    Cardiovascular: Negative for chest pain.        Subjective          Yeimi Larson presents to [facility]    Objective   Vital Signs:   Vitals:    07/25/22 0936   BP: 120/78   BP Location: Left arm   Patient Position: Sitting   Cuff Size: Infant   Pulse: 76   Temp: 97.7 °F (36.5 °C)   SpO2: 97%   Weight: 61.6 kg (135 lb 12.8 oz)   Height: 162.6 cm (64\")      Physical Exam  Vitals and nursing note reviewed.   Constitutional:       Appearance: Normal appearance. She is well-developed.   HENT:      Head: Normocephalic and atraumatic.      Nose: Nose normal.   Eyes:      Conjunctiva/sclera: Conjunctivae normal.      Pupils: Pupils are equal, round, and reactive to light.   Neck:      Thyroid: No thyromegaly.   Cardiovascular:      Rate and Rhythm: Normal rate and regular rhythm.      Heart sounds: Normal heart sounds. No murmur heard.  Pulmonary:      Effort: Pulmonary effort is normal.      Breath sounds: Normal breath sounds.   Abdominal:      General: Abdomen is flat. Bowel sounds are normal. There is no distension.      Palpations: Abdomen " is soft. There is no hepatomegaly, splenomegaly or mass.      Tenderness: There is no abdominal tenderness. There is no guarding or rebound.      Hernia: No hernia is present.   Musculoskeletal:         General: Normal range of motion.      Cervical back: Normal range of motion and neck supple.      Right lower leg: No edema.      Left lower leg: No edema.   Lymphadenopathy:      Cervical: No cervical adenopathy.   Skin:     General: Skin is warm.   Neurological:      General: No focal deficit present.      Mental Status: She is alert.   Psychiatric:         Mood and Affect: Mood normal.         Behavior: Behavior normal.         Thought Content: Thought content normal.         Judgment: Judgment normal.        Result Review :     Common labs    Common Labsle 1/3/22 1/3/22 1/3/22    0920 0920 0920   Glucose 90     BUN 18     Creatinine 0.92     eGFR Non  Am 62     eGFR  Am 71     Sodium 142     Potassium 4.9     Chloride 103     Calcium 8.8     Total Protein 7.0     Albumin 4.0     Total Bilirubin 0.4     Alkaline Phosphatase 102     AST (SGOT) 15     ALT (SGPT) 6     WBC   5.4   Hemoglobin   12.7   Hematocrit   38.5   Platelets   296   Total Cholesterol  213 (A)    Triglycerides  108    HDL Cholesterol  62    LDL Cholesterol   132 (A)    (A) Abnormal value       Comments are available for some flowsheets but are not being displayed.           TSH    TSH 1/3/22   TSH 2.750               January 2022 --- rheumatoid factor negative      No results found for: ANADIRECT, FERRITIN, TESTOSTEROTT, [id number], FOLATE, RF, BNP            Assessment and Plan    Diagnoses and all orders for this visit:    1. Thoracic spondylosis (Primary)  -controlled  Due to update yearly Urine Tox screen and contract for compliance today.  Elliot report reviewed, appropriate (May refill was not picked up on the Elliot report for what ever reason?)  Very low risk patient behavior.  No change with medications.  Renal function  remains normal, may continue Mobic 15 mg daily.  Will refill upon request  Continue/refill Ultram as directed below, quantity 75 per 30 days.  -     traMADol (ULTRAM) 50 MG tablet; Take 1 tablet by mouth Every 8 (Eight) Hours As Needed for Moderate Pain .  Dispense: 75 tablet; Refill: 2  -     ToxASSURE Select 13 Discrete -    2. High risk medication use  -     ToxASSURE Select 13 Discrete -        Follow Up   Return in about 4 months (around 11/25/2022) for Recheck.  Patient was given instructions and counseling regarding her condition or for health maintenance advice. Please see specific information pulled into the AVS if appropriate.

## 2022-07-28 LAB
6MAM UR QL CFM: NEGATIVE
6MAM/CREAT UR: NOT DETECTED NG/MG CREAT
7AMINOCLONAZEPAM/CREAT UR: NOT DETECTED NG/MG CREAT
A-OH ALPRAZ/CREAT UR: NOT DETECTED NG/MG CREAT
A-OH-TRIAZOLAM/CREAT UR CFM: NOT DETECTED NG/MG CREAT
ALFENTANIL/CREAT UR CFM: NOT DETECTED NG/MG CREAT
ALPHA-HYDROXYMIDAZOLAM, URINE: NOT DETECTED NG/MG CREAT
ALPRAZ/CREAT UR CFM: NOT DETECTED NG/MG CREAT
AMOBARBITAL UR QL CFM: NOT DETECTED
AMPHET/CREAT UR: NOT DETECTED NG/MG CREAT
AMPHETAMINES UR QL CFM: NEGATIVE
BARBITAL UR QL CFM: NOT DETECTED
BARBITURATES UR QL CFM: NEGATIVE
BENZODIAZ UR QL CFM: NEGATIVE
BUPRENORPHINE UR QL CFM: NEGATIVE
BUPRENORPHINE/CREAT UR: NOT DETECTED NG/MG CREAT
BUTABARBITAL UR QL CFM: NOT DETECTED
BUTALBITAL UR QL CFM: NOT DETECTED
BZE/CREAT UR: NOT DETECTED NG/MG CREAT
CANNABINOIDS UR QL CFM: NEGATIVE
CARBOXYTHC/CREAT UR: NOT DETECTED NG/MG CREAT
CLONAZEPAM/CREAT UR CFM: NOT DETECTED NG/MG CREAT
COCAETHYLENE/CREAT UR CFM: NOT DETECTED NG/MG CREAT
COCAINE UR QL CFM: NEGATIVE
COCAINE/CREAT UR CFM: NOT DETECTED NG/MG CREAT
CODEINE/CREAT UR: NOT DETECTED NG/MG CREAT
CREAT UR-MCNC: 230 MG/DL
DESALKYLFLURAZ/CREAT UR: NOT DETECTED NG/MG CREAT
DESMETHYLFLUNITRAZEPAM: NOT DETECTED NG/MG CREAT
DHC/CREAT UR: NOT DETECTED NG/MG CREAT
DIAZEPAM/CREAT UR: NOT DETECTED NG/MG CREAT
DRUGS UR: NORMAL
EDDP/CREAT UR: NOT DETECTED NG/MG CREAT
ETHANOL UR CFM-MCNC: NOT DETECTED G/DL
ETHANOL UR QL CFM: NEGATIVE
FENTANYL UR QL CFM: NEGATIVE
FENTANYL/CREAT UR: NOT DETECTED NG/MG CREAT
FLUNITRAZEPAM UR QL CFM: NOT DETECTED NG/MG CREAT
HYDROCODONE/CREAT UR: NOT DETECTED NG/MG CREAT
HYDROMORPHONE/CREAT UR: NOT DETECTED NG/MG CREAT
LORAZEPAM/CREAT UR: NOT DETECTED NG/MG CREAT
MDA/CREAT UR: NOT DETECTED NG/MG CREAT
MDMA/CREAT UR: NOT DETECTED NG/MG CREAT
MEPHOBARBITAL UR QL CFM: NOT DETECTED
METHADONE UR QL CFM: NEGATIVE
METHADONE/CREAT UR: NOT DETECTED NG/MG CREAT
METHAMPHET/CREAT UR: NOT DETECTED NG/MG CREAT
MIDAZOLAM/CREAT UR CFM: NOT DETECTED NG/MG CREAT
MORPHINE/CREAT UR: NOT DETECTED NG/MG CREAT
N-NORTRAMADOL/CREAT UR CFM: >2174 NG/MG CREAT
NARCOTICS UR: NORMAL
NORBUPRENORPHINE/CREAT UR: NOT DETECTED NG/MG CREAT
NORCODEINE/CREAT UR CFM: NOT DETECTED NG/MG CREAT
NORDIAZEPAM/CREAT UR: NOT DETECTED NG/MG CREAT
NORFENTANYL/CREAT UR: NOT DETECTED NG/MG CREAT
NORHYDROCODONE/CREAT UR: NOT DETECTED NG/MG CREAT
NORMORPHINE UR-MCNC: NOT DETECTED NG/MG CREAT
NOROXYCODONE/CREAT UR: NOT DETECTED NG/MG CREAT
NOROXYMORPHONE/CREAT UR CFM: NOT DETECTED NG/MG CREAT
O-NORTRAMADOL UR CFM-MCNC: >2174 NG/MG CREAT
OPIATES UR QL CFM: NEGATIVE
OXAZEPAM/CREAT UR: NOT DETECTED NG/MG CREAT
OXYCODONE UR QL CFM: NEGATIVE
OXYCODONE/CREAT UR: NOT DETECTED NG/MG CREAT
OXYMORPHONE/CREAT UR: NOT DETECTED NG/MG CREAT
PENTOBARB UR QL CFM: NOT DETECTED
PHENOBARB UR QL CFM: NOT DETECTED
SECOBARBITAL UR QL CFM: NOT DETECTED
SERVICE CMNT 02-IMP: NORMAL
SUFENTANIL/CREAT UR CFM: NOT DETECTED NG/MG CREAT
TAPENTADOL UR QL CFM: NEGATIVE
TAPENTADOL/CREAT UR: NOT DETECTED NG/MG CREAT
TEMAZEPAM/CREAT UR: NOT DETECTED NG/MG CREAT
THIOPENTAL UR QL CFM: NOT DETECTED
TRAMADOL UR QL CFM: >2174 NG/MG CREAT

## 2022-11-29 ENCOUNTER — OFFICE VISIT (OUTPATIENT)
Dept: FAMILY MEDICINE CLINIC | Facility: CLINIC | Age: 72
End: 2022-11-29

## 2022-11-29 VITALS
DIASTOLIC BLOOD PRESSURE: 78 MMHG | BODY MASS INDEX: 22.71 KG/M2 | SYSTOLIC BLOOD PRESSURE: 126 MMHG | TEMPERATURE: 98 F | HEIGHT: 64 IN | OXYGEN SATURATION: 97 % | HEART RATE: 76 BPM | WEIGHT: 133 LBS

## 2022-11-29 DIAGNOSIS — F51.02 ADJUSTMENT INSOMNIA: ICD-10-CM

## 2022-11-29 DIAGNOSIS — M81.0 AGE-RELATED OSTEOPOROSIS WITHOUT CURRENT PATHOLOGICAL FRACTURE: ICD-10-CM

## 2022-11-29 DIAGNOSIS — F33.41 RECURRENT MAJOR DEPRESSIVE DISORDER, IN PARTIAL REMISSION: ICD-10-CM

## 2022-11-29 DIAGNOSIS — M47.814 THORACIC SPONDYLOSIS: Primary | ICD-10-CM

## 2022-11-29 DIAGNOSIS — F43.21 GRIEF REACTION: ICD-10-CM

## 2022-11-29 PROBLEM — F43.20 GRIEF REACTION: Status: ACTIVE | Noted: 2022-11-29

## 2022-11-29 PROCEDURE — 99214 OFFICE O/P EST MOD 30 MIN: CPT | Performed by: FAMILY MEDICINE

## 2022-11-29 RX ORDER — IBANDRONATE SODIUM 150 MG/1
150 TABLET, FILM COATED ORAL
Qty: 3 TABLET | Refills: 1 | Status: SHIPPED | OUTPATIENT
Start: 2022-11-29

## 2022-11-29 RX ORDER — TRAMADOL HYDROCHLORIDE 50 MG/1
50 TABLET ORAL EVERY 8 HOURS PRN
Qty: 75 TABLET | Refills: 2 | Status: SHIPPED | OUTPATIENT
Start: 2022-12-01 | End: 2023-03-10 | Stop reason: SDUPTHER

## 2022-11-29 RX ORDER — MELOXICAM 15 MG/1
15 TABLET ORAL DAILY
Qty: 90 TABLET | Refills: 1 | Status: SHIPPED | OUTPATIENT
Start: 2022-11-29

## 2022-11-29 RX ORDER — ESCITALOPRAM OXALATE 20 MG/1
20 TABLET ORAL DAILY
Qty: 90 TABLET | Refills: 1 | Status: SHIPPED | OUTPATIENT
Start: 2022-11-29

## 2022-11-29 RX ORDER — HYDROXYZINE HYDROCHLORIDE 10 MG/1
10 TABLET, FILM COATED ORAL 3 TIMES DAILY PRN
Qty: 60 TABLET | Refills: 3 | Status: SHIPPED | OUTPATIENT
Start: 2022-11-29 | End: 2023-03-10

## 2022-12-06 ENCOUNTER — TELEPHONE (OUTPATIENT)
Dept: FAMILY MEDICINE CLINIC | Facility: CLINIC | Age: 72
End: 2022-12-06

## 2022-12-06 DIAGNOSIS — M81.0 AGE-RELATED OSTEOPOROSIS WITHOUT CURRENT PATHOLOGICAL FRACTURE: Primary | ICD-10-CM

## 2022-12-06 NOTE — TELEPHONE ENCOUNTER
Patient notified and Dexa orders placed will get labs on next visit can we please call her to schedule her 3 month check up when we can

## 2022-12-06 NOTE — TELEPHONE ENCOUNTER
Pt is calling because she tested positive for COVID. Her symptoms include lost voice and sore throat and she cough on occasion    Wants to know if there is anything she can do or if she should ride it out      She also stated she was supposed to have labs and a bone density     Please advise.

## 2023-03-10 ENCOUNTER — OFFICE VISIT (OUTPATIENT)
Dept: FAMILY MEDICINE CLINIC | Facility: CLINIC | Age: 73
End: 2023-03-10
Payer: MEDICARE

## 2023-03-10 VITALS
BODY MASS INDEX: 22.84 KG/M2 | OXYGEN SATURATION: 98 % | HEIGHT: 64 IN | SYSTOLIC BLOOD PRESSURE: 130 MMHG | DIASTOLIC BLOOD PRESSURE: 70 MMHG | TEMPERATURE: 97.5 F | HEART RATE: 68 BPM | WEIGHT: 133.8 LBS

## 2023-03-10 DIAGNOSIS — Z79.899 HIGH RISK MEDICATION USE: ICD-10-CM

## 2023-03-10 DIAGNOSIS — M15.9 PRIMARY OSTEOARTHRITIS INVOLVING MULTIPLE JOINTS: ICD-10-CM

## 2023-03-10 DIAGNOSIS — M81.0 AGE-RELATED OSTEOPOROSIS WITHOUT CURRENT PATHOLOGICAL FRACTURE: ICD-10-CM

## 2023-03-10 DIAGNOSIS — R25.1 TREMOR: ICD-10-CM

## 2023-03-10 DIAGNOSIS — M47.814 THORACIC SPONDYLOSIS: ICD-10-CM

## 2023-03-10 DIAGNOSIS — Z12.31 ENCOUNTER FOR SCREENING MAMMOGRAM FOR BREAST CANCER: ICD-10-CM

## 2023-03-10 DIAGNOSIS — Z00.00 ENCOUNTER FOR MEDICARE ANNUAL WELLNESS EXAM: Primary | ICD-10-CM

## 2023-03-10 DIAGNOSIS — E55.9 VITAMIN D DEFICIENCY: ICD-10-CM

## 2023-03-10 DIAGNOSIS — F33.42 RECURRENT MAJOR DEPRESSIVE DISORDER, IN FULL REMISSION: ICD-10-CM

## 2023-03-10 DIAGNOSIS — R42 LIGHTHEADEDNESS: ICD-10-CM

## 2023-03-10 PROCEDURE — 1159F MED LIST DOCD IN RCRD: CPT | Performed by: FAMILY MEDICINE

## 2023-03-10 PROCEDURE — G0439 PPPS, SUBSEQ VISIT: HCPCS | Performed by: FAMILY MEDICINE

## 2023-03-10 PROCEDURE — 96160 PT-FOCUSED HLTH RISK ASSMT: CPT | Performed by: FAMILY MEDICINE

## 2023-03-10 PROCEDURE — 1170F FXNL STATUS ASSESSED: CPT | Performed by: FAMILY MEDICINE

## 2023-03-10 PROCEDURE — 1126F AMNT PAIN NOTED NONE PRSNT: CPT | Performed by: FAMILY MEDICINE

## 2023-03-10 PROCEDURE — 99214 OFFICE O/P EST MOD 30 MIN: CPT | Performed by: FAMILY MEDICINE

## 2023-03-10 RX ORDER — TRAMADOL HYDROCHLORIDE 50 MG/1
50 TABLET ORAL EVERY 8 HOURS PRN
Qty: 75 TABLET | Refills: 2 | Status: SHIPPED | OUTPATIENT
Start: 2023-03-20

## 2023-03-10 NOTE — PROGRESS NOTES
The ABCs of the Annual Wellness Visit  Subsequent Medicare Wellness Visit    Chief Complaint   Patient presents with   • Medicare Wellness-subsequent     Yearly wellness patient is fasting last mammogram done 2/2022 she is scheduled for Bone density has colonoscopy 12/2020   • Thoracic spondylosis   • Depression   • Tremors     Hands get trembly daughter wanted her to check   • Dizziness     On occasion can be standing talking to neighbor outside and she gets a little lightheaded and needs to sit down      Subjective    History of Present Illness:  Yeimi Larson is a 72 y.o. female who presents for a Subsequent Medicare Wellness Visit.    NEEDS ANNUAL WELLNESS EXAM  And  3 to 4-month follow-up for thoracic spondylosis, OA, depression/grief reaction/insomnia, and complaints of a tremor and lightheadedness    LOV with me in November for chronic med refills, grief reaction, adjustment insomnia  -- Vistaril was added nightly to help with insomnia, recent grief (passing of her .)    Overall, doing better.  She maintains a very healthy and active lifestyle.  Has a great support system in place.  Also just got a new puppy and cat.  Sleep has been improved, no longer taking Vistaril.  Mood good, no longer on Wellbutrin, only taking Lexapro.  Weight remains stable.  Appetite good.    However she does have a few complaints and concerns today.  -- Complains of very intermittent episodes of lightheadedness.  She states she has noticed episodes where she will get lightheaded/dizzy while standing.  This is only happened 2-3 times over the last 4 to 5 years.  She thinks it may be related to not eating breakfast or lunch, as she is a snacker and will only eat 1 full meal a day (generally dinnertime.)  These episodes occurred while she was outside standing up and talking to a neighbor, only lasted a few seconds, resolved when she sat down.  No associated chest pain, SOA, or palpitations.    --Also, complains of a  tremor.  This has been present for about 1 year, not getting worse.  Generally only occurs when reaching for something, mild.  Uncertain about her father's family history.  No falls.  No increased caffeine use.    Otherwise, needs chronic med refills.  Due for yearly fasting labs.  Compliant with and tolerates all medications without side effects.  Still relies on daily Mobic and Ultram quantity of 2 to 3/day for thoracic spondylosis and multiple joint arthralgias.    Routine health maintenance/screening test:  PAP --- not applicable, aged out  MAMMO --- February 2022, negative  DEXA --- over 2 years ago  Colorectal Screen --- January 2021 C-scope done, diverticuli only  Vaccines --- up-to-date  Smoking/ETOH Status --- non-smoker, social alcohol only  Dentist, Eye Exam, Derm --- maintains regular dental visits, due for ophthalmology exam, has Derm  Diet/Exercise --- maintains a healthy well-balanced diet and regular cardio exercise  Pertinent FH --- negative for premature CAD, colon cancer, breast CA (father's history unknown.)    The following portions of the patient's history were reviewed and   updated as appropriate: allergies, current medications, past family history, past medical history, past social history, past surgical history and problem list.    Compared to one year ago, the patient feels her physical   health is the same.    Compared to one year ago, the patient feels her mental   health is the same.    Recent Hospitalizations:  She was not admitted to the hospital during the last year.       Current Medical Providers:  Patient Care Team:  Gregoria Cerna MD as PCP - General (Family Medicine)    Outpatient Medications Prior to Visit   Medication Sig Dispense Refill   • cholecalciferol (VITAMIN D3) 1000 units tablet Take 1 tablet by mouth Daily.     • escitalopram (LEXAPRO) 20 MG tablet Take 1 tablet by mouth Daily. 90 tablet 1   • ibandronate (BONIVA) 150 MG tablet Take 1 tablet by mouth Every 30  (Thirty) Days. 3 tablet 1   • meloxicam (MOBIC) 15 MG tablet Take 1 tablet by mouth Daily. 90 tablet 1   • vitamin B-12 (CYANOCOBALAMIN) 2500 MCG sublingual tablet tablet Place 2,500 mcg under the tongue Daily.     • traMADol (ULTRAM) 50 MG tablet Take 1 tablet by mouth Every 8 (Eight) Hours As Needed for Moderate Pain. 75 tablet 2   • hydrOXYzine (ATARAX) 10 MG tablet Take 1 tablet by mouth 3 (Three) Times a Day As Needed for Anxiety. 60 tablet 3     No facility-administered medications prior to visit.       Opioid medication/s are on active medication list.  and I have evaluated her active treatment plan and pain score trends (see table).  Vitals:    03/10/23 1018   PainSc: 0-No pain     I have reviewed the chart for potential of high risk medication and harmful drug interactions in the elderly.            Aspirin is not on active medication list.  Aspirin use is not indicated based on review of current medical condition/s. Risk of harm outweighs potential benefits.  .    Patient Active Problem List   Diagnosis   • Thoracic spondylosis   • Recurrent major depressive disorder, in full remission (HCC)   • Age-related osteoporosis without current pathological fracture   • High risk medication use   • History of diverticulosis   • Positive colorectal cancer screening using DNA-based stool test   • DDD (degenerative disc disease), cervical   • Acute left-sided low back pain with left-sided sciatica   • Tremor   • Osteoarthritis (arthritis due to wear and tear of joints)   • Grief reaction   • Adjustment insomnia   • Lightheadedness   • Vitamin D deficiency     Advance Care Planning  Advance Directive is on file.  ACP discussion was held with the patient during this visit. Patient has an advance directive in EMR which is still valid.     Review of Systems   Constitutional: Negative for fever.   Respiratory: Negative for cough and shortness of breath.    Cardiovascular: Negative for chest pain.   Neurological: Positive  "for tremors and light-headedness.        Objective    Vitals:    03/10/23 1018   BP: 130/70   BP Location: Left arm   Patient Position: Sitting   Cuff Size: Adult   Pulse: 68   Temp: 97.5 °F (36.4 °C)   SpO2: 98%   Weight: 60.7 kg (133 lb 12.8 oz)   Height: 162.6 cm (64\")   PainSc: 0-No pain     BMI Readings from Last 1 Encounters:   03/10/23 22.97 kg/m²   BMI is within normal parameters. No follow-up required.    Does the patient have evidence of cognitive impairment? No    Physical Exam  Vitals and nursing note reviewed.   Constitutional:       Appearance: Normal appearance. She is well-developed.   HENT:      Head: Normocephalic and atraumatic.      Right Ear: Tympanic membrane normal.      Left Ear: Tympanic membrane normal.      Nose: Nose normal.   Eyes:      Conjunctiva/sclera: Conjunctivae normal.      Pupils: Pupils are equal, round, and reactive to light.   Neck:      Thyroid: No thyromegaly.   Cardiovascular:      Rate and Rhythm: Normal rate and regular rhythm.      Heart sounds: Normal heart sounds. No murmur heard.  Pulmonary:      Effort: Pulmonary effort is normal.      Breath sounds: Normal breath sounds.   Abdominal:      General: Abdomen is flat. Bowel sounds are normal. There is no distension.      Palpations: Abdomen is soft. There is no hepatomegaly, splenomegaly or mass.      Tenderness: There is no abdominal tenderness. There is no guarding or rebound.      Hernia: No hernia is present.   Musculoskeletal:         General: Normal range of motion.      Cervical back: Normal range of motion and neck supple.      Right lower leg: No edema.      Left lower leg: No edema.   Lymphadenopathy:      Cervical: No cervical adenopathy.   Skin:     General: Skin is warm.   Neurological:      General: No focal deficit present.      Mental Status: She is alert.      Comments: Normal gait  No cogwheel rigidity  No masked facies  Very mild tremor upon intention   Psychiatric:         Mood and Affect: Mood " normal.         Behavior: Behavior normal.         Thought Content: Thought content normal.         Judgment: Judgment normal.                     No results found for: ANADIRECT, FERRITIN, TESTOSTEROTT, [id number], FOLATE, RF, BNP      January 2022 --CBC, CMP, TSH, rheumatoid factor all normal,         HEALTH RISK ASSESSMENT    Smoking Status:  Social History     Tobacco Use   Smoking Status Former   • Packs/day: 0.00   • Years: 5.00   • Pack years: 0.00   • Types: Cigarettes   Smokeless Tobacco Never     Alcohol Consumption:  Social History     Substance and Sexual Activity   Alcohol Use Yes   • Alcohol/week: 2.0 standard drinks   • Types: 2 Glasses of wine per week     Fall Risk Screen:    ALLEN Fall Risk Assessment was completed, and patient is at LOW risk for falls.Assessment completed on:3/10/2023    Depression Screening:  PHQ-2/PHQ-9 Depression Screening 3/10/2023   Retired PHQ-9 Total Score -   Retired Total Score -   Little Interest or Pleasure in Doing Things 0-->not at all   Feeling Down, Depressed or Hopeless 0-->not at all   PHQ-9: Brief Depression Severity Measure Score 0       Health Habits and Functional and Cognitive Screening:  Functional & Cognitive Status 3/10/2023   Do you have difficulty preparing food and eating? No   Do you have difficulty bathing yourself, getting dressed or grooming yourself? No   Do you have difficulty using the toilet? No   Do you have difficulty moving around from place to place? No   Do you have trouble with steps or getting out of a bed or a chair? No   Current Diet Well Balanced Diet        Current Diet Comment not eating much at all   Dental Exam Not up to date   Eye Exam Up to date   Exercise (times per week) 7 times per week   Current Exercises Include Walking;Weightlifting        Exercise Comment yard work   Current Exercise Activities Include -   Do you need help using the phone?  No   Are you deaf or do you have serious difficulty hearing?  No   Do you  need help with transportation? No   Do you need help shopping? No   Do you need help preparing meals?  No   Do you need help with housework?  No   Do you need help with laundry? No   Do you need help taking your medications? No   Do you need help managing money? No   Do you ever drive or ride in a car without wearing a seat belt? No   Have you felt unusual stress, anger or loneliness in the last month? -   Who do you live with? -   If you need help, do you have trouble finding someone available to you? -   Have you been bothered in the last four weeks by sexual problems? -   Do you have difficulty concentrating, remembering or making decisions? -       Age-appropriate Screening Schedule:  Refer to the list below for future screening recommendations based on patient's age, sex and/or medical conditions. Orders for these recommended tests are listed in the plan section. The patient has been provided with a written plan.    Health Maintenance   Topic Date Due   • LIPID PANEL  01/03/2023   • ANNUAL WELLNESS VISIT  01/11/2023   • DXA SCAN  02/05/2023   • MAMMOGRAM  02/24/2024   • TDAP/TD VACCINES (2 - Td or Tdap) 04/09/2029   • COLORECTAL CANCER SCREENING  01/06/2031   • HEPATITIS C SCREENING  Completed   • COVID-19 Vaccine  Completed   • INFLUENZA VACCINE  Completed   • Pneumococcal Vaccine 65+  Completed   • ZOSTER VACCINE  Completed              Assessment & Plan   CMS Preventative Services Quick Reference  Risk Factors Identified During Encounter  None Identified  The above risks/problems have been discussed with the patient.  Follow up actions/plans if indicated are seen below in the Assessment/Plan Section.  Pertinent information has been shared with the patient in the After Visit Summary.    Diagnoses and all orders for this visit:    1. Encounter for Medicare annual wellness exam (Primary)  -S/P subsequent  exam done, WNL  All screening up-to-date except for needs: Mammogram, labs listed below, ophthalmology  visit.    Also due for DEXA, has upcoming appointment.  See orders below.  Otherwise, continue to improve upon healthy lifestyle --Needs low-carb/low calorie/low cholesterol diet and increase cardio exercise to greater than 150 minutes weekly  -     CBC & Differential  -     Comprehensive Metabolic Panel  -     Lipid Panel With LDL / HDL Ratio    2. Encounter for screening mammogram for breast cancer  -     Mammo Screening Bilateral With CAD; Future    3. Lightheadedness  -new diagnosis, mild  Check basic screening labs, CBC and CMP, TSH  Suspect this is due to not eating properly, see above HPI.  Often does not eat breakfast or lunch.  No other concerning symptoms, intermittent episodes/very rare over the last several years.  If becomes worse or other concerning symptoms then will need to work-up further.  -     CBC & Differential  -     TSH    4. Tremor  -new Dx, mild.  Suspect benign essential tremor, familial?  No concerning features for neurological disorder.  Check CBC, CMP, TSH and B12  Discussed possible medications if this becomes worse or if she desires, declines at this time.  -     TSH  -     Vitamin B12 & Folate    5. Thoracic spondylosis  -stable  Update Urine Tox screen and controlled substance contract today for compliance.  Elliot report reviewed, appropriate.  Very low risk patient behavior.  Plan to continue Mobic as needed provided CBC and renal function remain normal.  May continue/refill Ultram as directed below, next refill due on 3/18/2023  -     traMADol (ULTRAM) 50 MG tablet; Take 1 tablet by mouth Every 8 (Eight) Hours As Needed for Moderate Pain.  Dispense: 75 tablet; Refill: 2  -     ToxASSURE Select 13 Discrete -    6. Primary osteoarthritis involving multiple joints  -stable, same plan as above    7. Age-related osteoporosis without current pathological fracture  -controlled?  Due to recheck DEXA, has upcoming appointment  If stable, plan to continue Boniva 150 mg monthly, will refill  upon request    8. Recurrent major depressive disorder, in full remission (HCC)  -stable  Continue Lexapro 20 mg daily    9. Vitamin D deficiency  -     Vitamin D,25-Hydroxy    10. High risk medication use  -     ToxASSURE Select 13 Discrete -    In addition to wellness exam today, seen and treated for separate and identifiable new diagnoses of lightheadedness and tremor, see above treatment plan and work-up        Follow Up:   Return in about 3 months (around 6/10/2023) for Recheck.     An After Visit Summary and PPPS were made available to the patient.

## 2023-03-10 NOTE — PATIENT INSTRUCTIONS
Needs to eat regular meals 3-5 small frequent meals.  No skipping breakfast or lunch.    Further recommendations to follow based on labs    Recommend low fat/low calorie diet and exercise greater than 150 minutes of cardio per     Needs to see ophthalmologist, also needs mammogram and bone density--- DEXA already scheduled, will call with mammogram      week.

## 2023-03-11 PROBLEM — Z63.6 CAREGIVER STRESS: Status: RESOLVED | Noted: 2022-07-25 | Resolved: 2023-03-11

## 2023-03-18 LAB
25(OH)D3+25(OH)D2 SERPL-MCNC: 43.5 NG/ML (ref 30–100)
6MAM UR QL CFM: NEGATIVE
6MAM/CREAT UR: NOT DETECTED NG/MG CREAT
7AMINOCLONAZEPAM/CREAT UR: NOT DETECTED NG/MG CREAT
A-OH ALPRAZ/CREAT UR: NOT DETECTED NG/MG CREAT
A-OH-TRIAZOLAM/CREAT UR CFM: NOT DETECTED NG/MG CREAT
ALBUMIN SERPL-MCNC: 4.3 G/DL (ref 3.5–5.2)
ALBUMIN/GLOB SERPL: 1.7 G/DL
ALFENTANIL/CREAT UR CFM: NOT DETECTED NG/MG CREAT
ALP SERPL-CCNC: 111 U/L (ref 39–117)
ALPHA-HYDROXYMIDAZOLAM, URINE: NOT DETECTED NG/MG CREAT
ALPRAZ/CREAT UR CFM: NOT DETECTED NG/MG CREAT
ALT SERPL-CCNC: 6 U/L (ref 1–33)
AMOBARBITAL UR QL CFM: NOT DETECTED
AMPHET/CREAT UR: NOT DETECTED NG/MG CREAT
AMPHETAMINES UR QL CFM: NEGATIVE
AST SERPL-CCNC: 15 U/L (ref 1–32)
BARBITAL UR QL CFM: NOT DETECTED
BARBITURATES UR QL CFM: NEGATIVE
BASOPHILS # BLD AUTO: 0.05 10*3/MM3 (ref 0–0.2)
BASOPHILS NFR BLD AUTO: 1.2 % (ref 0–1.5)
BENZODIAZ UR QL CFM: NEGATIVE
BILIRUB SERPL-MCNC: 0.6 MG/DL (ref 0–1.2)
BUN SERPL-MCNC: 18 MG/DL (ref 8–23)
BUN/CREAT SERPL: 19.8 (ref 7–25)
BUPRENORPHINE UR QL CFM: NEGATIVE
BUPRENORPHINE/CREAT UR: NOT DETECTED NG/MG CREAT
BUTABARBITAL UR QL CFM: NOT DETECTED
BUTALBITAL UR QL CFM: NOT DETECTED
BZE/CREAT UR: NOT DETECTED NG/MG CREAT
CALCIUM SERPL-MCNC: 9 MG/DL (ref 8.6–10.5)
CANNABINOIDS UR QL CFM: NEGATIVE
CARBOXYTHC/CREAT UR: NOT DETECTED NG/MG CREAT
CHLORIDE SERPL-SCNC: 100 MMOL/L (ref 98–107)
CHOLEST SERPL-MCNC: 210 MG/DL (ref 0–200)
CLONAZEPAM/CREAT UR CFM: NOT DETECTED NG/MG CREAT
CO2 SERPL-SCNC: 27 MMOL/L (ref 22–29)
COCAETHYLENE/CREAT UR CFM: NOT DETECTED NG/MG CREAT
COCAINE UR QL CFM: NEGATIVE
COCAINE/CREAT UR CFM: NOT DETECTED NG/MG CREAT
CODEINE/CREAT UR: NOT DETECTED NG/MG CREAT
CREAT SERPL-MCNC: 0.91 MG/DL (ref 0.57–1)
CREAT UR-MCNC: 240 MG/DL
DESALKYLFLURAZ/CREAT UR: NOT DETECTED NG/MG CREAT
DESMETHYLFLUNITRAZEPAM: NOT DETECTED NG/MG CREAT
DHC/CREAT UR: NOT DETECTED NG/MG CREAT
DIAZEPAM/CREAT UR: NOT DETECTED NG/MG CREAT
DRUGS UR: NORMAL
EDDP/CREAT UR: NOT DETECTED NG/MG CREAT
EGFRCR SERPLBLD CKD-EPI 2021: 66.3 ML/MIN/1.73
EOSINOPHIL # BLD AUTO: 0.01 10*3/MM3 (ref 0–0.4)
EOSINOPHIL NFR BLD AUTO: 0.2 % (ref 0.3–6.2)
ERYTHROCYTE [DISTWIDTH] IN BLOOD BY AUTOMATED COUNT: 13.5 % (ref 12.3–15.4)
ETHANOL UR CFM-MCNC: NOT DETECTED G/DL
ETHANOL UR QL CFM: NEGATIVE
FENTANYL UR QL CFM: NEGATIVE
FENTANYL/CREAT UR: NOT DETECTED NG/MG CREAT
FLUNITRAZEPAM UR QL CFM: NOT DETECTED NG/MG CREAT
FOLATE SERPL-MCNC: 6.37 NG/ML (ref 4.78–24.2)
GLOBULIN SER CALC-MCNC: 2.6 GM/DL
GLUCOSE SERPL-MCNC: 90 MG/DL (ref 65–99)
HCT VFR BLD AUTO: 38 % (ref 34–46.6)
HDLC SERPL-MCNC: 60 MG/DL (ref 40–60)
HGB BLD-MCNC: 12.8 G/DL (ref 12–15.9)
HYDROCODONE/CREAT UR: NOT DETECTED NG/MG CREAT
HYDROMORPHONE/CREAT UR: NOT DETECTED NG/MG CREAT
IMM GRANULOCYTES # BLD AUTO: 0.01 10*3/MM3 (ref 0–0.05)
IMM GRANULOCYTES NFR BLD AUTO: 0.2 % (ref 0–0.5)
LDLC SERPL CALC-MCNC: 136 MG/DL (ref 0–100)
LDLC/HDLC SERPL: 2.24 {RATIO}
LORAZEPAM/CREAT UR: NOT DETECTED NG/MG CREAT
LYMPHOCYTES # BLD AUTO: 1.82 10*3/MM3 (ref 0.7–3.1)
LYMPHOCYTES NFR BLD AUTO: 41.9 % (ref 19.6–45.3)
MCH RBC QN AUTO: 30.5 PG (ref 26.6–33)
MCHC RBC AUTO-ENTMCNC: 33.7 G/DL (ref 31.5–35.7)
MCV RBC AUTO: 90.7 FL (ref 79–97)
MDA/CREAT UR: NOT DETECTED NG/MG CREAT
MDMA/CREAT UR: NOT DETECTED NG/MG CREAT
MEPHOBARBITAL UR QL CFM: NOT DETECTED
METHADONE UR QL CFM: NEGATIVE
METHADONE/CREAT UR: NOT DETECTED NG/MG CREAT
METHAMPHET/CREAT UR: NOT DETECTED NG/MG CREAT
MIDAZOLAM/CREAT UR CFM: NOT DETECTED NG/MG CREAT
MONOCYTES # BLD AUTO: 0.63 10*3/MM3 (ref 0.1–0.9)
MONOCYTES NFR BLD AUTO: 14.5 % (ref 5–12)
MORPHINE/CREAT UR: NOT DETECTED NG/MG CREAT
N-NORTRAMADOL/CREAT UR CFM: >2083 NG/MG CREAT
NARCOTICS UR: NORMAL
NEUTROPHILS # BLD AUTO: 1.82 10*3/MM3 (ref 1.7–7)
NEUTROPHILS NFR BLD AUTO: 42 % (ref 42.7–76)
NORBUPRENORPHINE/CREAT UR: NOT DETECTED NG/MG CREAT
NORCODEINE/CREAT UR CFM: NOT DETECTED NG/MG CREAT
NORDIAZEPAM/CREAT UR: NOT DETECTED NG/MG CREAT
NORFENTANYL/CREAT UR: NOT DETECTED NG/MG CREAT
NORHYDROCODONE/CREAT UR: NOT DETECTED NG/MG CREAT
NORMORPHINE UR-MCNC: NOT DETECTED NG/MG CREAT
NOROXYCODONE/CREAT UR: NOT DETECTED NG/MG CREAT
NOROXYMORPHONE/CREAT UR CFM: NOT DETECTED NG/MG CREAT
NRBC BLD AUTO-RTO: 0 /100 WBC (ref 0–0.2)
O-NORTRAMADOL UR CFM-MCNC: >2083 NG/MG CREAT
OPIATES UR QL CFM: NEGATIVE
OXAZEPAM/CREAT UR: NOT DETECTED NG/MG CREAT
OXYCODONE UR QL CFM: NEGATIVE
OXYCODONE/CREAT UR: NOT DETECTED NG/MG CREAT
OXYMORPHONE/CREAT UR: NOT DETECTED NG/MG CREAT
PENTOBARB UR QL CFM: NOT DETECTED
PHENOBARB UR QL CFM: NOT DETECTED
PLATELET # BLD AUTO: 278 10*3/MM3 (ref 140–450)
POTASSIUM SERPL-SCNC: 5.1 MMOL/L (ref 3.5–5.2)
PROT SERPL-MCNC: 6.9 G/DL (ref 6–8.5)
RBC # BLD AUTO: 4.19 10*6/MM3 (ref 3.77–5.28)
SECOBARBITAL UR QL CFM: NOT DETECTED
SERVICE CMNT 02-IMP: NORMAL
SODIUM SERPL-SCNC: 138 MMOL/L (ref 136–145)
SUFENTANIL/CREAT UR CFM: NOT DETECTED NG/MG CREAT
TAPENTADOL UR QL CFM: NEGATIVE
TAPENTADOL/CREAT UR: NOT DETECTED NG/MG CREAT
TEMAZEPAM/CREAT UR: NOT DETECTED NG/MG CREAT
THIOPENTAL UR QL CFM: NOT DETECTED
TRAMADOL UR QL CFM: >2083 NG/MG CREAT
TRIGL SERPL-MCNC: 77 MG/DL (ref 0–150)
TSH SERPL DL<=0.005 MIU/L-ACNC: 2.03 UIU/ML (ref 0.27–4.2)
VIT B12 SERPL-MCNC: 1014 PG/ML (ref 211–946)
VLDLC SERPL CALC-MCNC: 14 MG/DL (ref 5–40)
WBC # BLD AUTO: 4.34 10*3/MM3 (ref 3.4–10.8)

## 2023-03-20 NOTE — PROGRESS NOTES
Slightly high cholesterol, low risk patient  Needs low-carb/low calorie/low cholesterol diet and increase cardio exercise to greater than 150 minutes weekly  Same plan.  Same follow-up

## 2023-03-30 ENCOUNTER — HOSPITAL ENCOUNTER (OUTPATIENT)
Dept: BONE DENSITY | Facility: HOSPITAL | Age: 73
Discharge: HOME | End: 2023-03-30
Payer: MEDICARE

## 2023-03-30 ENCOUNTER — HOSPITAL ENCOUNTER (OUTPATIENT)
Dept: MAMMOGRAPHY | Facility: HOSPITAL | Age: 73
Discharge: HOME | End: 2023-03-30
Payer: MEDICARE

## 2023-03-30 DIAGNOSIS — M81.0 AGE-RELATED OSTEOPOROSIS WITHOUT CURRENT PATHOLOGICAL FRACTURE: ICD-10-CM

## 2023-03-30 DIAGNOSIS — Z12.31 ENCOUNTER FOR SCREENING MAMMOGRAM FOR BREAST CANCER: ICD-10-CM

## 2023-03-30 PROCEDURE — 77067 SCR MAMMO BI INCL CAD: CPT

## 2023-03-30 PROCEDURE — 77063 BREAST TOMOSYNTHESIS BI: CPT

## 2023-03-30 PROCEDURE — 77080 DXA BONE DENSITY AXIAL: CPT

## 2023-06-12 ENCOUNTER — OFFICE VISIT (OUTPATIENT)
Dept: FAMILY MEDICINE CLINIC | Facility: CLINIC | Age: 73
End: 2023-06-12
Payer: MEDICARE

## 2023-06-12 VITALS
HEIGHT: 64 IN | TEMPERATURE: 97.5 F | WEIGHT: 131.2 LBS | HEART RATE: 76 BPM | SYSTOLIC BLOOD PRESSURE: 142 MMHG | OXYGEN SATURATION: 98 % | DIASTOLIC BLOOD PRESSURE: 80 MMHG | BODY MASS INDEX: 22.4 KG/M2

## 2023-06-12 DIAGNOSIS — M81.0 AGE-RELATED OSTEOPOROSIS WITHOUT CURRENT PATHOLOGICAL FRACTURE: ICD-10-CM

## 2023-06-12 DIAGNOSIS — M47.814 THORACIC SPONDYLOSIS: Primary | ICD-10-CM

## 2023-06-12 DIAGNOSIS — F33.42 RECURRENT MAJOR DEPRESSIVE DISORDER, IN FULL REMISSION: ICD-10-CM

## 2023-06-12 DIAGNOSIS — J06.9 VIRAL UPPER RESPIRATORY TRACT INFECTION: ICD-10-CM

## 2023-06-12 DIAGNOSIS — F51.02 ADJUSTMENT INSOMNIA: ICD-10-CM

## 2023-06-12 PROCEDURE — 99214 OFFICE O/P EST MOD 30 MIN: CPT | Performed by: FAMILY MEDICINE

## 2023-06-12 PROCEDURE — 1160F RVW MEDS BY RX/DR IN RCRD: CPT | Performed by: FAMILY MEDICINE

## 2023-06-12 PROCEDURE — 1159F MED LIST DOCD IN RCRD: CPT | Performed by: FAMILY MEDICINE

## 2023-06-12 RX ORDER — TRAMADOL HYDROCHLORIDE 50 MG/1
50 TABLET ORAL EVERY 8 HOURS PRN
Qty: 75 TABLET | Refills: 2 | Status: SHIPPED | OUTPATIENT
Start: 2023-06-12

## 2023-06-12 RX ORDER — HYDROXYZINE HYDROCHLORIDE 25 MG/1
TABLET, FILM COATED ORAL
Qty: 30 TABLET | Refills: 2 | Status: CANCELLED | OUTPATIENT
Start: 2023-06-12

## 2023-06-12 RX ORDER — TRAZODONE HYDROCHLORIDE 50 MG/1
50 TABLET ORAL NIGHTLY
Status: CANCELLED
Start: 2023-06-12

## 2023-06-12 RX ORDER — MIRTAZAPINE 15 MG/1
15 TABLET, FILM COATED ORAL NIGHTLY
Status: CANCELLED | OUTPATIENT
Start: 2023-06-12

## 2023-06-12 NOTE — PROGRESS NOTES
"Chief Complaint  Thoracic spondylosis (Chronic pain med refill), Osteoarthritis, Depression, and Fatigue (Just getting over respiratory virus no treatment)    3-month follow-up for thoracic spondylosis  And  6-month follow-up on depression, osteoporosis, and complaints of recent head cold    LOV with me in March for chronic med refills, Medicare wellness, and minor complaints of lightheadedness and new tremor  -- Screening test updated, mammogram and DEXA done, needs to review results.  -- All labs WNL, no med changes made.  -- Benign new essential tremor, no treatment initiated.  -- Encouraged to eat regular meals, not to skip breakfast or lunch.  Monitor closely.    Overall, continues to do well.  Still maintains an active and healthy lifestyle.  Sleeping much better, now has new puppy!  Weight remains good.  Mood stable.    Only new complaint is a recent \"head cold\" that started about 3 weeks ago.  This started with a sore throat and a mild cough.  No fever, no congestion.  Doing much better over the last few days.  Thinks she is \"getting over this respiratory illness.\"  Associated with some mild fatigue.    Otherwise, needs chronic med refills.  Had recent DEXA and mammogram.  Compliant with and tolerates all medications without side effects.  Still takes tramadol a quantity of 2/day and on occasions may take a third dose for chronic thoracic spondylosis.    Review of Systems   Constitutional:  Negative for fever and unexpected weight change.   Respiratory:  Negative for cough and shortness of breath.    Cardiovascular:  Negative for chest pain.      Subjective          Yeimi Larson presents to [facility]    Objective   Vital Signs:   Vitals:    06/12/23 1057 06/12/23 1104   BP: 150/70 142/80   BP Location: Right arm    Patient Position: Sitting    Cuff Size: Adult    Pulse: 76    Temp: 97.5 °F (36.4 °C)    SpO2: 98%    Weight: 59.5 kg (131 lb 3.2 oz)    Height: 162.6 cm (64\")     "   Body mass index is 22.52 kg/m².   Physical Exam  Vitals and nursing note reviewed.   Constitutional:       Appearance: Normal appearance. She is well-developed.   HENT:      Head: Normocephalic and atraumatic.      Right Ear: Tympanic membrane normal.      Left Ear: Tympanic membrane normal.      Nose: Nose normal.   Eyes:      Conjunctiva/sclera: Conjunctivae normal.      Pupils: Pupils are equal, round, and reactive to light.   Neck:      Thyroid: No thyromegaly.   Cardiovascular:      Rate and Rhythm: Normal rate and regular rhythm.      Heart sounds: Normal heart sounds. No murmur heard.  Pulmonary:      Effort: Pulmonary effort is normal. No respiratory distress.      Breath sounds: Normal breath sounds. No wheezing or rales.   Abdominal:      General: Abdomen is flat. Bowel sounds are normal. There is no distension.      Palpations: Abdomen is soft. There is no hepatomegaly, splenomegaly or mass.      Tenderness: There is no abdominal tenderness. There is no guarding or rebound.      Hernia: No hernia is present.   Musculoskeletal:         General: Normal range of motion.      Cervical back: Normal range of motion and neck supple.      Right lower leg: No edema.      Left lower leg: No edema.   Lymphadenopathy:      Cervical: No cervical adenopathy.   Skin:     General: Skin is warm.   Neurological:      General: No focal deficit present.      Mental Status: She is alert.   Psychiatric:         Mood and Affect: Mood normal.         Behavior: Behavior normal.         Thought Content: Thought content normal.         Judgment: Judgment normal.      Result Review :     Common labs          3/10/2023    11:09   Common Labs   Glucose 90    BUN 18    Creatinine 0.91    Sodium 138    Potassium 5.1    Chloride 100    Calcium 9.0    Total Protein 6.9    Albumin 4.3    Total Bilirubin 0.6    Alkaline Phosphatase 111    AST (SGOT) 15    ALT (SGPT) 6    WBC 4.34    Hemoglobin 12.8    Hematocrit 38.0    Platelets 278     Total Cholesterol 210    Triglycerides 77    HDL Cholesterol 60    LDL Cholesterol  136      TSH          3/10/2023    11:09   TSH   TSH 2.030          Lab Results   Component Value Date    ASQJ35JL 43.5 03/10/2023    FOLATE 6.37 03/10/2023      DEXA Bone Density Axial (03/30/2023 10:55)          Assessment and Plan    Diagnoses and all orders for this visit:    1. Thoracic spondylosis (Primary) -controlled  March 2023 and Elliot report both reviewed, appropriate.  Plan to continue/refill Ultram as directed below  -     traMADol (ULTRAM) 50 MG tablet; Take 1 tablet by mouth Every 8 (Eight) Hours As Needed for Moderate Pain.  Dispense: 75 tablet; Refill: 2    2. Recurrent major depressive disorder, in full remission -controlled  Doing well on Lexapro 20 mg daily only.    3. Age-related osteoporosis without current pathological fracture -stable  DEXA reviewed, stable findings.  Plan to continue Boniva as prescribed    4. Adjustment insomnia -doing much better, no medication needed.    5. Viral upper respiratory tract infection -new diagnosis.    Mild upper respiratory viral illness, clinically improving.  Hold on any antibiotics.  Continue with conservative treatment.        Follow Up   Return in about 3 months (around 9/12/2023) for Recheck.  Patient was given instructions and counseling regarding her condition or for health maintenance advice. Please see specific information pulled into the AVS if appropriate.

## 2023-09-13 ENCOUNTER — OFFICE VISIT (OUTPATIENT)
Dept: FAMILY MEDICINE CLINIC | Facility: CLINIC | Age: 73
End: 2023-09-13
Payer: MEDICARE

## 2023-09-13 VITALS — BODY MASS INDEX: 22.52 KG/M2 | HEIGHT: 64 IN

## 2023-09-13 DIAGNOSIS — Z53.21 PATIENT LEFT WITHOUT BEING SEEN: Primary | ICD-10-CM

## 2023-09-18 ENCOUNTER — OFFICE VISIT (OUTPATIENT)
Dept: FAMILY MEDICINE CLINIC | Facility: CLINIC | Age: 73
End: 2023-09-18
Payer: MEDICARE

## 2023-09-18 VITALS
HEART RATE: 74 BPM | TEMPERATURE: 97.5 F | DIASTOLIC BLOOD PRESSURE: 70 MMHG | WEIGHT: 130 LBS | BODY MASS INDEX: 22.2 KG/M2 | SYSTOLIC BLOOD PRESSURE: 132 MMHG | OXYGEN SATURATION: 96 % | HEIGHT: 64 IN

## 2023-09-18 DIAGNOSIS — M81.0 AGE-RELATED OSTEOPOROSIS WITHOUT CURRENT PATHOLOGICAL FRACTURE: ICD-10-CM

## 2023-09-18 DIAGNOSIS — M47.814 THORACIC SPONDYLOSIS: ICD-10-CM

## 2023-09-18 DIAGNOSIS — Z91.81 STATUS POST FALL: ICD-10-CM

## 2023-09-18 DIAGNOSIS — Z09 HOSPITAL DISCHARGE FOLLOW-UP: Primary | ICD-10-CM

## 2023-09-18 DIAGNOSIS — S09.90XD INJURY OF HEAD, SUBSEQUENT ENCOUNTER: ICD-10-CM

## 2023-09-18 PROBLEM — M54.42 ACUTE LEFT-SIDED LOW BACK PAIN WITH LEFT-SIDED SCIATICA: Status: RESOLVED | Noted: 2021-03-02 | Resolved: 2023-09-18

## 2023-09-18 PROCEDURE — 1160F RVW MEDS BY RX/DR IN RCRD: CPT | Performed by: FAMILY MEDICINE

## 2023-09-18 PROCEDURE — 1159F MED LIST DOCD IN RCRD: CPT | Performed by: FAMILY MEDICINE

## 2023-09-18 PROCEDURE — 99214 OFFICE O/P EST MOD 30 MIN: CPT | Performed by: FAMILY MEDICINE

## 2023-09-18 RX ORDER — TRAMADOL HYDROCHLORIDE 50 MG/1
50 TABLET ORAL EVERY 8 HOURS PRN
Qty: 75 TABLET | Refills: 2 | Status: SHIPPED | OUTPATIENT
Start: 2023-09-30

## 2023-09-18 RX ORDER — IBANDRONATE SODIUM 150 MG/1
150 TABLET, FILM COATED ORAL
Qty: 11 TABLET | Refills: 0 | Status: SHIPPED | OUTPATIENT
Start: 2023-09-18

## 2023-09-18 NOTE — PROGRESS NOTES
Chief Complaint  Back Pain (Thoracic spondylosis med refill ), Osteoporosis (Med refill), and Depression    3 month follow-up on thoracic spondylosis and 6-month follow-up on osteoporosis  And  Needs 2-month ER follow-up after fall with LOC    LOV with me in June for chronic med refills only.  No changes made at that visit, all stable.    Other interval history since last seen by me --S/P ER visit about 2 months ago, seen at [facility] on 7/10/2023 after mechanical fall with LOC, records reviewed.  She had an accidental fall earlier that day while working outside.  Apparently she was walking backwards while pulling on some branches and either lost her balance or stepped on a root, subsequently fell and hit the left side of her head against a brick wall and landed on her back.  Brief loss of consciousness, witnessed event.  Therefore, went to [facility] for further evaluation.  S/P multiple imaging studies done--- CT head without, CT cervical spine, thoracic spine, and chest all done with no acute abnormalities or fractures.  She was given Norco 7.5 mg a quantity of 15 and told to follow-up with her PCP if not better??  However, the records state to follow-up with her PCP in 2 days?  Nonetheless, she did recover uneventfully and never scheduled an ER follow-up.    Overall, continues to do well.  Planning her first vacation in over 10 years with her family later this month.  Headed to [place] x1 week, very excited.  Still maintains a healthy and active lifestyle.  Enjoys walking.  Weight remains stable.  Mood is good.  Sleeping much better with her new puppy!    No new complaints or concerns today.  Needs chronic med refills.  Compliant with and tolerates all medications without side effects.  Still relies on Mobic daily, along with her tramadol a quantity of 2 to 3/day for cervical and thoracic DDD  Last DEXA done about 6 months ago, stable osteoporosis      Review of Systems   Constitutional:  Negative for fever and  "unexpected weight change.   Respiratory:  Negative for cough and shortness of breath.    Cardiovascular:  Negative for chest pain.      Subjective          Yeimi Larson presents to [facility]    Objective   Vital Signs:   Vitals:    09/18/23 0912   BP: 132/70   BP Location: Left arm   Patient Position: Sitting   Cuff Size: Adult   Pulse: 74   Temp: 97.5 °F (36.4 °C)   SpO2: 96%   Weight: 59 kg (130 lb)   Height: 162.6 cm (64\")      Body mass index is 22.31 kg/m².   Physical Exam  Vitals and nursing note reviewed.   Constitutional:       Appearance: Normal appearance. She is well-developed.   HENT:      Head: Normocephalic and atraumatic.      Nose: Nose normal.   Eyes:      Conjunctiva/sclera: Conjunctivae normal.      Pupils: Pupils are equal, round, and reactive to light.   Neck:      Thyroid: No thyromegaly.   Cardiovascular:      Rate and Rhythm: Normal rate and regular rhythm.      Heart sounds: Normal heart sounds. No murmur heard.  Pulmonary:      Effort: Pulmonary effort is normal.      Breath sounds: Normal breath sounds.   Abdominal:      General: Abdomen is flat. Bowel sounds are normal. There is no distension.      Palpations: Abdomen is soft. There is no hepatomegaly, splenomegaly or mass.      Tenderness: There is no abdominal tenderness. There is no guarding or rebound.      Hernia: No hernia is present.   Musculoskeletal:         General: Normal range of motion.      Cervical back: Normal range of motion and neck supple.      Right lower leg: No edema.      Left lower leg: No edema.   Lymphadenopathy:      Cervical: No cervical adenopathy.   Skin:     General: Skin is warm.   Neurological:      General: No focal deficit present.      Mental Status: She is alert.   Psychiatric:         Mood and Affect: Mood normal.         Behavior: Behavior normal.         Thought Content: Thought content normal.         Judgment: Judgment normal.      Result Review :     Common labs  "         3/10/2023    11:09   Common Labs   Glucose 90    BUN 18    Creatinine 0.91    Sodium 138    Potassium 5.1    Chloride 100    Calcium 9.0    Total Protein 6.9    Albumin 4.3    Total Bilirubin 0.6    Alkaline Phosphatase 111    AST (SGOT) 15    ALT (SGPT) 6    WBC 4.34    Hemoglobin 12.8    Hematocrit 38.0    Platelets 278    Total Cholesterol 210    Triglycerides 77    HDL Cholesterol 60    LDL Cholesterol  136      TSH          3/10/2023    11:09   TSH   TSH 2.030            Lab Results   Component Value Date    OGLK30EZ 43.5 03/10/2023    FOLATE 6.37 03/10/2023        ER records from 7/10/2023, [facility] reviewed --- CT without contrast chest, C-spine, T-spine, and head all done and reviewed           Assessment and Plan    Diagnoses and all orders for this visit:    1. Hospital discharge follow-up (Primary) --S/P 2-month ER follow-up after mechanical fall with LOC  [facility] records reviewed, status post multiple imaging studies all negative.    2. Status post fall --accidental, hit head with brief loss of consciousness  S/P CT head without contrast, CT chest, CT cervical spine and thoracic spine all done and reviewed.  No acute fractures, bleeds.  Status post head injury, chest wall pain, acute on chronic thoracic pain.  She has recovered uneventfully.    3. Injury of head, subsequent encounter --resolved    4. Thoracic spondylosis --controlled  Urine Tox screen/March 2023, controlled substance contract, and [facility] report all reviewed, appropriate.  No change with meds.  May continue Mobic 15 mg daily  May continue/refill Ultram as directed below, next refill due 9/30/2023  -     traMADol (ULTRAM) 50 MG tablet; Take 1 tablet by mouth Every 8 (Eight) Hours As Needed for Moderate Pain.  Dispense: 75 tablet; Refill: 2    5. Age-related osteoporosis without current pathological fracture --stable  DEXA up-to-date, March 2023.  No change with Boniva  Increase strength/weightbearing exercise along with cardio  exercise greater than 150 minutes weekly.  Also, continue calcium 1200 to 1500 mg daily, may take Os-Talha 2/day     Other orders  -     ibandronate (BONIVA) 150 MG tablet; Take 1 tablet by mouth Every 30 (Thirty) Days.  Dispense: 11 tablet; Refill: 0        Follow Up   Return in about 3 months (around 12/18/2023) for Recheck, schedule III month and 6-month Medicare.  Patient was given instructions and counseling regarding her condition or for health maintenance advice. Please see specific information pulled into the AVS if appropriate.

## 2023-10-27 NOTE — PROGRESS NOTES
Patient left without being seenThe patient left the office after care was provided and did not complete the visit for unknown reasons.

## 2024-02-14 RX ORDER — MELOXICAM 15 MG/1
15 TABLET ORAL DAILY
Qty: 90 TABLET | Refills: 3 | Status: SHIPPED | OUTPATIENT
Start: 2024-02-14

## 2024-09-27 ENCOUNTER — OFFICE VISIT (OUTPATIENT)
Dept: FAMILY MEDICINE CLINIC | Facility: CLINIC | Age: 74
End: 2024-09-27
Payer: MEDICARE

## 2024-09-27 VITALS
TEMPERATURE: 97.5 F | HEIGHT: 64 IN | OXYGEN SATURATION: 100 % | DIASTOLIC BLOOD PRESSURE: 70 MMHG | WEIGHT: 142.6 LBS | BODY MASS INDEX: 24.34 KG/M2 | HEART RATE: 70 BPM | SYSTOLIC BLOOD PRESSURE: 110 MMHG

## 2024-09-27 DIAGNOSIS — Z79.899 HIGH RISK MEDICATION USE: ICD-10-CM

## 2024-09-27 DIAGNOSIS — Z76.89 ENCOUNTER TO ESTABLISH CARE: Primary | ICD-10-CM

## 2024-09-27 DIAGNOSIS — L23.5 ALLERGIC DERMATITIS DUE TO OTHER CHEMICAL PRODUCT: ICD-10-CM

## 2024-09-27 DIAGNOSIS — E78.5 MILD HYPERLIPIDEMIA: ICD-10-CM

## 2024-09-27 DIAGNOSIS — F33.42 RECURRENT MAJOR DEPRESSIVE DISORDER, IN FULL REMISSION: ICD-10-CM

## 2024-09-27 DIAGNOSIS — R41.3 MEMORY LOSS: ICD-10-CM

## 2024-09-27 DIAGNOSIS — M47.814 THORACIC SPONDYLOSIS: ICD-10-CM

## 2024-09-27 DIAGNOSIS — R25.1 TREMOR: ICD-10-CM

## 2024-09-27 DIAGNOSIS — M81.0 AGE-RELATED OSTEOPOROSIS WITHOUT CURRENT PATHOLOGICAL FRACTURE: ICD-10-CM

## 2024-09-27 DIAGNOSIS — E55.9 VITAMIN D DEFICIENCY: ICD-10-CM

## 2024-09-27 DIAGNOSIS — Z78.9 MEDICALLY COMPLEX PATIENT: ICD-10-CM

## 2024-09-27 DIAGNOSIS — N28.9 RENAL INSUFFICIENCY: ICD-10-CM

## 2024-09-27 PROBLEM — F51.02 ADJUSTMENT INSOMNIA: Status: RESOLVED | Noted: 2022-11-29 | Resolved: 2024-09-27

## 2024-09-27 PROBLEM — L25.9 DERMATITIS VENENATA: Status: ACTIVE | Noted: 2024-09-27

## 2024-09-27 PROCEDURE — 1126F AMNT PAIN NOTED NONE PRSNT: CPT | Performed by: FAMILY MEDICINE

## 2024-09-27 PROCEDURE — G2211 COMPLEX E/M VISIT ADD ON: HCPCS | Performed by: FAMILY MEDICINE

## 2024-09-27 PROCEDURE — 99214 OFFICE O/P EST MOD 30 MIN: CPT | Performed by: FAMILY MEDICINE

## 2024-09-27 RX ORDER — METHYLPREDNISOLONE 4 MG
TABLET, DOSE PACK ORAL SEE ADMIN INSTRUCTIONS
COMMUNITY
Start: 2024-09-22

## 2024-09-27 RX ORDER — DIPHENHYDRAMINE HCL 25 MG
25 CAPSULE ORAL EVERY 4 HOURS PRN
COMMUNITY
Start: 2024-09-22

## 2024-09-27 RX ORDER — SENNOSIDES 8.6 MG
650 CAPSULE ORAL EVERY 8 HOURS PRN
Qty: 60 TABLET | Refills: 5 | Status: SHIPPED | OUTPATIENT
Start: 2024-09-27

## 2024-09-27 RX ORDER — TRAMADOL HYDROCHLORIDE 50 MG/1
50 TABLET ORAL EVERY 8 HOURS PRN
Qty: 45 TABLET | Refills: 2 | Status: SHIPPED | OUTPATIENT
Start: 2024-09-27

## 2024-09-27 RX ORDER — SENNOSIDES 8.6 MG
650 CAPSULE ORAL EVERY 8 HOURS PRN
COMMUNITY
End: 2024-09-27 | Stop reason: SDUPTHER

## 2024-09-27 RX ORDER — DESONIDE 0.5 MG/G
1 OINTMENT TOPICAL 2 TIMES DAILY
COMMUNITY
Start: 2024-09-24

## 2024-09-27 RX ORDER — MELOXICAM 15 MG/1
15 TABLET ORAL DAILY
Qty: 90 TABLET | Refills: 3 | Status: CANCELLED | OUTPATIENT
Start: 2024-09-27

## 2024-10-04 LAB
25(OH)D3+25(OH)D2 SERPL-MCNC: 44.5 NG/ML (ref 30–100)
6MAM UR QL CFM: NEGATIVE
6MAM/CREAT UR: NOT DETECTED NG/MG CREAT
7AMINOCLONAZEPAM/CREAT UR: NOT DETECTED NG/MG CREAT
A-OH ALPRAZ/CREAT UR: NOT DETECTED NG/MG CREAT
A-OH-TRIAZOLAM/CREAT UR CFM: NOT DETECTED NG/MG CREAT
ALBUMIN SERPL-MCNC: 4.3 G/DL (ref 3.8–4.8)
ALFENTANIL/CREAT UR CFM: NOT DETECTED NG/MG CREAT
ALP SERPL-CCNC: 61 IU/L (ref 44–121)
ALPHA-HYDROXYMIDAZOLAM, URINE: NOT DETECTED NG/MG CREAT
ALPRAZ/CREAT UR CFM: NOT DETECTED NG/MG CREAT
ALT SERPL-CCNC: 14 IU/L (ref 0–32)
AMOBARBITAL UR QL CFM: NOT DETECTED
AMPHET/CREAT UR: NOT DETECTED NG/MG CREAT
AMPHETAMINES UR QL CFM: NEGATIVE
AST SERPL-CCNC: 17 IU/L (ref 0–40)
BARBITAL UR QL CFM: NOT DETECTED
BARBITURATES UR QL CFM: NEGATIVE
BASOPHILS # BLD AUTO: 0.1 X10E3/UL (ref 0–0.2)
BASOPHILS NFR BLD AUTO: 1 %
BENZODIAZ UR QL CFM: NEGATIVE
BILIRUB SERPL-MCNC: 0.4 MG/DL (ref 0–1.2)
BUN SERPL-MCNC: 30 MG/DL (ref 8–27)
BUN/CREAT SERPL: 24 (ref 12–28)
BUPRENORPHINE UR QL CFM: NEGATIVE
BUPRENORPHINE/CREAT UR: NOT DETECTED NG/MG CREAT
BUTABARBITAL UR QL CFM: NOT DETECTED
BUTALBITAL UR QL CFM: NOT DETECTED
BZE/CREAT UR: NOT DETECTED NG/MG CREAT
CALCIUM SERPL-MCNC: 9.5 MG/DL (ref 8.7–10.3)
CANNABINOIDS UR QL CFM: NEGATIVE
CARBOXYTHC/CREAT UR: NOT DETECTED NG/MG CREAT
CHLORIDE SERPL-SCNC: 102 MMOL/L (ref 96–106)
CHOLEST SERPL-MCNC: 151 MG/DL (ref 100–199)
CLONAZEPAM/CREAT UR CFM: NOT DETECTED NG/MG CREAT
CO2 SERPL-SCNC: 23 MMOL/L (ref 20–29)
COCAETHYLENE/CREAT UR CFM: NOT DETECTED NG/MG CREAT
COCAINE UR QL CFM: NEGATIVE
COCAINE/CREAT UR CFM: NOT DETECTED NG/MG CREAT
CODEINE/CREAT UR: NOT DETECTED NG/MG CREAT
CREAT SERPL-MCNC: 1.23 MG/DL (ref 0.57–1)
CREAT UR-MCNC: 236 MG/DL
DESALKYLFLURAZ/CREAT UR: NOT DETECTED NG/MG CREAT
DESMETHYLFLUNITRAZEPAM: NOT DETECTED NG/MG CREAT
DHC/CREAT UR: NOT DETECTED NG/MG CREAT
DIAZEPAM/CREAT UR: NOT DETECTED NG/MG CREAT
DRUGS UR: NORMAL
EDDP/CREAT UR: NOT DETECTED NG/MG CREAT
EGFRCR SERPLBLD CKD-EPI 2021: 46 ML/MIN/1.73
EOSINOPHIL # BLD AUTO: 0.1 X10E3/UL (ref 0–0.4)
EOSINOPHIL NFR BLD AUTO: 1 %
ERYTHROCYTE [DISTWIDTH] IN BLOOD BY AUTOMATED COUNT: 13.6 % (ref 11.7–15.4)
ETHANOL UR CFM-MCNC: NOT DETECTED G/DL
ETHANOL UR QL CFM: NEGATIVE
FENTANYL UR QL CFM: NEGATIVE
FENTANYL/CREAT UR: NOT DETECTED NG/MG CREAT
FLUNITRAZEPAM UR QL CFM: NOT DETECTED NG/MG CREAT
GLOBULIN SER CALC-MCNC: 2.2 G/DL (ref 1.5–4.5)
GLUCOSE SERPL-MCNC: 79 MG/DL (ref 70–99)
HCT VFR BLD AUTO: 39.2 % (ref 34–46.6)
HDLC SERPL-MCNC: 73 MG/DL
HGB BLD-MCNC: 12.3 G/DL (ref 11.1–15.9)
HYDROCODONE/CREAT UR: NOT DETECTED NG/MG CREAT
HYDROMORPHONE/CREAT UR: NOT DETECTED NG/MG CREAT
IMM GRANULOCYTES # BLD AUTO: 0 X10E3/UL (ref 0–0.1)
IMM GRANULOCYTES NFR BLD AUTO: 1 %
LDLC SERPL CALC-MCNC: 59 MG/DL (ref 0–99)
LDLC/HDLC SERPL: 0.8 RATIO (ref 0–3.2)
LORAZEPAM/CREAT UR: NOT DETECTED NG/MG CREAT
LYMPHOCYTES # BLD AUTO: 3.9 X10E3/UL (ref 0.7–3.1)
LYMPHOCYTES NFR BLD AUTO: 45 %
MCH RBC QN AUTO: 30.9 PG (ref 26.6–33)
MCHC RBC AUTO-ENTMCNC: 31.4 G/DL (ref 31.5–35.7)
MCV RBC AUTO: 99 FL (ref 79–97)
MDA/CREAT UR: NOT DETECTED NG/MG CREAT
MDMA/CREAT UR: NOT DETECTED NG/MG CREAT
MEPHOBARBITAL UR QL CFM: NOT DETECTED
METHADONE UR QL CFM: NEGATIVE
METHADONE/CREAT UR: NOT DETECTED NG/MG CREAT
METHAMPHET/CREAT UR: NOT DETECTED NG/MG CREAT
MIDAZOLAM/CREAT UR CFM: NOT DETECTED NG/MG CREAT
MONOCYTES # BLD AUTO: 1.1 X10E3/UL (ref 0.1–0.9)
MONOCYTES NFR BLD AUTO: 13 %
MORPHINE/CREAT UR: NOT DETECTED NG/MG CREAT
N-NORTRAMADOL/CREAT UR CFM: 1489 NG/MG CREAT
NARCOTICS UR: NORMAL
NEUTROPHILS # BLD AUTO: 3.3 X10E3/UL (ref 1.4–7)
NEUTROPHILS NFR BLD AUTO: 39 %
NORBUPRENORPHINE/CREAT UR: NOT DETECTED NG/MG CREAT
NORCODEINE/CREAT UR CFM: NOT DETECTED NG/MG CREAT
NORDIAZEPAM/CREAT UR: NOT DETECTED NG/MG CREAT
NORFENTANYL/CREAT UR: NOT DETECTED NG/MG CREAT
NORHYDROCODONE/CREAT UR: NOT DETECTED NG/MG CREAT
NORMORPHINE UR-MCNC: NOT DETECTED NG/MG CREAT
NOROXYCODONE/CREAT UR: NOT DETECTED NG/MG CREAT
NOROXYMORPHONE/CREAT UR CFM: NOT DETECTED NG/MG CREAT
O-NORTRAMADOL UR CFM-MCNC: >2119 NG/MG CREAT
OPIATES UR QL CFM: NEGATIVE
OXAZEPAM/CREAT UR: NOT DETECTED NG/MG CREAT
OXYCODONE UR QL CFM: NEGATIVE
OXYCODONE/CREAT UR: NOT DETECTED NG/MG CREAT
OXYMORPHONE/CREAT UR: NOT DETECTED NG/MG CREAT
PENTOBARB UR QL CFM: NOT DETECTED
PHENOBARB UR QL CFM: NOT DETECTED
PLATELET # BLD AUTO: 302 X10E3/UL (ref 150–450)
POTASSIUM SERPL-SCNC: 4.4 MMOL/L (ref 3.5–5.2)
PROT SERPL-MCNC: 6.5 G/DL (ref 6–8.5)
RBC # BLD AUTO: 3.98 X10E6/UL (ref 3.77–5.28)
SECOBARBITAL UR QL CFM: NOT DETECTED
SERVICE CMNT 02-IMP: NORMAL
SODIUM SERPL-SCNC: 139 MMOL/L (ref 134–144)
SUFENTANIL/CREAT UR CFM: NOT DETECTED NG/MG CREAT
TAPENTADOL UR QL CFM: NEGATIVE
TAPENTADOL/CREAT UR: NOT DETECTED NG/MG CREAT
TEMAZEPAM/CREAT UR: NOT DETECTED NG/MG CREAT
THIOPENTAL UR QL CFM: NOT DETECTED
TRAMADOL UR QL CFM: >2119 NG/MG CREAT
TRIGL SERPL-MCNC: 108 MG/DL (ref 0–149)
TSH SERPL DL<=0.005 MIU/L-ACNC: 4.27 UIU/ML (ref 0.45–4.5)
VLDLC SERPL CALC-MCNC: 19 MG/DL (ref 5–40)
WBC # BLD AUTO: 8.4 X10E3/UL (ref 3.4–10.8)

## 2025-01-07 DIAGNOSIS — M47.814 THORACIC SPONDYLOSIS: ICD-10-CM

## 2025-01-07 NOTE — TELEPHONE ENCOUNTER
Rx Refill Note  Requested Prescriptions     Pending Prescriptions Disp Refills    traMADol (ULTRAM) 50 MG tablet [Pharmacy Med Name: TRAMADOL 50MG TABLETS] 75 tablet      Sig: TAKE 1 TABLET BY MOUTH EVERY 8 HOURS AS NEEDED FOR MODERATE PAIN      Last office visit with prescribing clinician: 9/27/2024   Last telemedicine visit with prescribing clinician: Visit date not found   Next office visit with prescribing clinician: 1/21/2025                         Would you like a call back once the refill request has been completed: [] Yes [] No    If the office needs to give you a call back, can they leave a voicemail: [] Yes [] No    Dion Byrnes CMA/LMR  01/07/25, 11:31 EST

## 2025-01-10 RX ORDER — TRAMADOL HYDROCHLORIDE 50 MG/1
50 TABLET ORAL EVERY 8 HOURS PRN
Qty: 25 TABLET | Refills: 0 | Status: SHIPPED | OUTPATIENT
Start: 2025-01-10

## 2025-01-21 ENCOUNTER — OFFICE VISIT (OUTPATIENT)
Dept: FAMILY MEDICINE CLINIC | Facility: CLINIC | Age: 75
End: 2025-01-21
Payer: MEDICARE

## 2025-01-21 VITALS
BODY MASS INDEX: 23.32 KG/M2 | TEMPERATURE: 96.9 F | SYSTOLIC BLOOD PRESSURE: 112 MMHG | OXYGEN SATURATION: 99 % | DIASTOLIC BLOOD PRESSURE: 68 MMHG | HEIGHT: 64 IN | HEART RATE: 76 BPM | WEIGHT: 136.6 LBS

## 2025-01-21 DIAGNOSIS — E78.5 HYPERLIPIDEMIA, UNSPECIFIED HYPERLIPIDEMIA TYPE: ICD-10-CM

## 2025-01-21 DIAGNOSIS — N18.2 STAGE 2 CHRONIC KIDNEY DISEASE: ICD-10-CM

## 2025-01-21 DIAGNOSIS — R55 NEAR SYNCOPE: ICD-10-CM

## 2025-01-21 DIAGNOSIS — R42 DIZZINESS: ICD-10-CM

## 2025-01-21 DIAGNOSIS — Z78.9 MEDICALLY COMPLEX PATIENT: ICD-10-CM

## 2025-01-21 DIAGNOSIS — R63.4 WEIGHT LOSS: ICD-10-CM

## 2025-01-21 DIAGNOSIS — M15.0 PRIMARY OSTEOARTHRITIS INVOLVING MULTIPLE JOINTS: ICD-10-CM

## 2025-01-21 DIAGNOSIS — M47.814 THORACIC SPONDYLOSIS: Primary | ICD-10-CM

## 2025-01-21 DIAGNOSIS — F41.8 ANXIETY WITH DEPRESSION: ICD-10-CM

## 2025-01-21 PROBLEM — N28.9 RENAL INSUFFICIENCY: Status: RESOLVED | Noted: 2024-09-27 | Resolved: 2025-01-21

## 2025-01-21 PROCEDURE — 99215 OFFICE O/P EST HI 40 MIN: CPT | Performed by: FAMILY MEDICINE

## 2025-01-21 PROCEDURE — 1159F MED LIST DOCD IN RCRD: CPT | Performed by: FAMILY MEDICINE

## 2025-01-21 PROCEDURE — 1126F AMNT PAIN NOTED NONE PRSNT: CPT | Performed by: FAMILY MEDICINE

## 2025-01-21 PROCEDURE — 1160F RVW MEDS BY RX/DR IN RCRD: CPT | Performed by: FAMILY MEDICINE

## 2025-01-21 PROCEDURE — G2211 COMPLEX E/M VISIT ADD ON: HCPCS | Performed by: FAMILY MEDICINE

## 2025-01-21 RX ORDER — TRAMADOL HYDROCHLORIDE 50 MG/1
50 TABLET ORAL EVERY 8 HOURS PRN
Qty: 45 TABLET | Refills: 0 | Status: SHIPPED | OUTPATIENT
Start: 2025-02-10 | End: 2025-01-21 | Stop reason: SDUPTHER

## 2025-01-21 RX ORDER — TRAMADOL HYDROCHLORIDE 50 MG/1
50 TABLET ORAL EVERY 8 HOURS PRN
Qty: 40 TABLET | Refills: 2 | Status: SHIPPED | OUTPATIENT
Start: 2025-02-10

## 2025-01-21 NOTE — PATIENT INSTRUCTIONS
Needs MRI of brain   needs 2D echo    Needs Holter monitor    Further recommendations based on today's labs, regarding thyroid kidney function

## 2025-01-21 NOTE — PROGRESS NOTES
"Chief Complaint  Osteoporosis (3 month follow up), Hyperlipidemia, Depression, Thoracic spondylosis, and Dizziness (Still with lightheaded dizziness)    3 to 4-month follow-up on lumbar spondylosis, OA, progressive CKD, and new complaints to me of ongoing issues with lightheadedness/dizziness    Last visit with me in September 2024 to get reestablished (left practice due to insurance change,) chronic med refills with tox screen, and ER follow-up for uncontrolled dermatitis.  -- Reestablish care, multiple sets of records reviewed  -- ER follow-up, records reviewed, uncontrolled dermatitis.  Referral to dermatologist    Routine labs resulted as below:  Progress Notes  Gregoria Cerna MD (Physician)  Family Medicine  With renal decline, really does need to stop her anti-inflammatory.  Use the tramadol as directed.  Other labs look good.  Although need to watch thyroid closely.  Plan to recheck at follow-up        She was compliant with above recommendations.  Did completely stop her Mobic and all anti-inflammatories.  Still only relies on her tramadol PRN    For the most part, continues to do well.  Although currently under more stress, in the process of selling her home and moving to a condo.  However her mood remains well-controlled with Lexapro.  Sleep has been adequate.  Still tries to maintain a healthy well-balanced diet and regular cardio exercise.  Has lost a few pounds during recent months, but blames this on trying to move.  Often finds herself just snacking, as it is difficult to make meals with \"a microwave in the closet!\"  \"Has to keep everything off the countertops, per realtor.\"    Today, still complains about episodes of \"lightheadedness/dizziness.\"  Has been experiencing intermittent episodes of lightheadedness x 1 year or longer now.  This occurs very sporadically, only happens when she is standing.  Does not occur at rest or changing positions.  Gets this feeling of lightheadedness, \"almost like " "going to pass out.\"  No actual syncope, no chest pain, no shortness of air, no headache, no nausea or vomiting.  Despite taking less tramadol and completely off Mobic and all anti-inflammatories, still experiencing these lightheadedness/dizziness episodes.    She did discuss this with her neurologist at [facility]/Dr. Redd almost 1 year ago.  Seen [facility] neurologist/Dr. Redd ABOUT 6 MONTHS AGO, on 3/18/2024, records reviewed again --- seen in consultation for dizziness, tremor, memory concerns.  MoCA testing 28 out of 30, no concerns for dementia.  Thought some mild memory impairment may be due to her tramadol?  Suggested taking less tramadol and reevaluating if things become worse with an MRI of brain and neuropsychological testing.  Carotid Doppler of neck was ordered and plaque only, no stenosis.  Tremor thought to be secondary to benign essential tremor only.  No treatment initiated.     In regards to her thoracic spondylosis and general multijoint arthritis --has been doing better with the new regimen of Tylenol 2/day and only taking tramadol 1/day (in a.m. only, may take 2nd if needed.) has remained off Mobic for the last several months now.     Needs chronic med refills.  Fasting labs done at last visit.  Compliant with and tolerates all medications without side effects.      Still maintained on tramadol quantity of 1/day (may take second dose rarely) for chronic diagnosis of multiple joint OA and thoracic spondylosis.  This allows adequate pain relief, enjoyment in life, and provides the ability to maintain general activity level.  There has been no change in history that would increase the risk of overdose or other adverse events.        Review of Systems   Constitutional:  Negative for fever and unexpected weight change.   Respiratory:  Negative for cough and shortness of breath.    Cardiovascular:  Negative for chest pain.   Neurological:  Positive for dizziness and light-headedness.        Subjective  " "        Yeimi Larson presents to [facility]    Objective   Vital Signs:   Vitals:    01/21/25 1125   BP: 112/68   BP Location: Right arm   Patient Position: Sitting   Cuff Size: Adult   Pulse: 76   Temp: 96.9 °F (36.1 °C)   SpO2: 99%   Weight: 62 kg (136 lb 9.6 oz)   Height: 162.6 cm (64\")      Body mass index is 23.45 kg/m².   Physical Exam  Vitals and nursing note reviewed.   Constitutional:       Appearance: Normal appearance. She is well-developed.   HENT:      Head: Normocephalic and atraumatic.      Right Ear: Tympanic membrane normal.      Left Ear: Tympanic membrane normal.      Nose: Nose normal.   Eyes:      Conjunctiva/sclera: Conjunctivae normal.      Pupils: Pupils are equal, round, and reactive to light.   Neck:      Thyroid: No thyromegaly.   Cardiovascular:      Rate and Rhythm: Normal rate and regular rhythm.      Heart sounds: Normal heart sounds. No murmur heard.  Pulmonary:      Effort: Pulmonary effort is normal. No respiratory distress.      Breath sounds: Normal breath sounds. No wheezing or rales.   Abdominal:      General: Abdomen is flat. Bowel sounds are normal. There is no distension.      Palpations: Abdomen is soft. There is no hepatomegaly, splenomegaly or mass.      Tenderness: There is no abdominal tenderness. There is no guarding or rebound.      Hernia: No hernia is present.   Musculoskeletal:         General: Normal range of motion.      Cervical back: Normal range of motion and neck supple.      Right lower leg: No edema.      Left lower leg: No edema.   Lymphadenopathy:      Cervical: No cervical adenopathy.   Skin:     General: Skin is warm.   Neurological:      General: No focal deficit present.      Mental Status: She is alert.   Psychiatric:         Mood and Affect: Mood normal.         Behavior: Behavior normal.         Thought Content: Thought content normal.         Judgment: Judgment normal.        Result Review :     Common labs          " 4/25/2024    12:24 9/27/2024    13:41   Common Labs   Glucose  79    BUN  30    Creatinine  1.23    Sodium  139    Potassium  4.4    Chloride  102    Calcium  9.5    Total Protein  6.5    Albumin  4.3    Total Bilirubin  0.4    Alkaline Phosphatase  61    AST (SGOT)  17    ALT (SGPT)  14    WBC  8.4    Hemoglobin  12.3    Hematocrit  39.2    Platelets  302    Total Cholesterol 143.0     151    Triglycerides  108    HDL Cholesterol 67     73    LDL Cholesterol  57.4     59       Details          This result is from an external source.             Lipid Panel          4/25/2024    12:24 9/27/2024    13:41   Lipid Panel   Total Cholesterol 143.0     151    Triglycerides  108    HDL Cholesterol 67     73    VLDL Cholesterol  19    LDL Cholesterol  57.4     59    LDL/HDL Ratio  0.8       Details          This result is from an external source.             TSH          9/27/2024    13:41   TSH   TSH 4.270            Lab Results   Component Value Date    LOAX64CA 44.5 09/27/2024    FOLATE 6.37 03/10/2023        DUPLEX CAROTID BILATERAL CAR (03/26/2024 11:39) --mild atherosclerotic disease, no stenosis           Assessment and Plan    Diagnoses and all orders for this visit:    1. Thoracic spondylosis (Primary) --controlled  No longer on NSAIDs/Mobic due to CKD.  Urine Tox screen and controlled substance contract up-to-date/September 2024 appropriate.  VIPIN/ INSPECT report reviewed, appropriate.  Adequate pain relief, good medication adherence, and low risk behaviors for misuse.  Refill tramadol as directed below, next refill due 2/10/2025  -     traMADol (ULTRAM) 50 MG tablet; Take 1 tablet by mouth Every 8 (Eight) Hours As Needed for Moderate Pain.  Dispense: 45 tablet; Refill: 0    2. Primary osteoarthritis involving multiple joints --controlled  Continue Tylenol as needed.  May continue tramadol as needed, same plan as above    3. Stage 2 chronic kidney disease --some progression?    Recheck renal function today,  hopefully improved since discontinuing Mobic and all NSAIDs at last visit  If improved or stable then no further workup.  Otherwise may need renal ultrasound?  Continue to avoid all NSAIDs  -     Basic Metabolic Panel    4. Hyperlipidemia, unspecified hyperlipidemia type --controlled  Lipids and LFTs up-to-date, no change with Crestor    5. Anxiety with depression --controlled, no change with Lexapro  -     TSH    6. Weight loss --new Dx, mild.  Asymptomatic.  Suspect this is due to recent stressors, moving/downsizing her home.  See above HPI.  Monitor closely.  Check TSH  -     TSH    7. Dizziness --remains uncontrolled  Continues with episodes of dizziness/lightheadedness, when standing only.  Otherwise asymptomatic.  Seen neurologist about 9 months ago, status post carotid ultrasound.  No improvement after stopping NSAIDs and decreasing dependency upon tramadol.  Neurologist recommended workup if persistent.  Check MRI brain, Zio patch, 2D echo.  S/P carotid ultrasound completed, with minimal plaque only  -     TSH  -     MRI Brain With & Without Contrast; Future  -     Holter Monitor - 72 Hour Up To 15 Days; Future  -     Adult Transthoracic Echo Complete W/ Cont if Necessary Per Protocol; Future    8. Near syncope --see above plan  -     Adult Transthoracic Echo Complete W/ Cont if Necessary Per Protocol; Future    9. Medically complex patient --- See all the above active chronic diagnoses that require close monitoring and longitudinal care.      I spent 45 minutes caring for Yeimi on this date of service. This time includes time spent by me in the following activities:preparing for the visit, reviewing tests, obtaining and/or reviewing a separately obtained history, performing a medically appropriate examination and/or evaluation , counseling and educating the patient/family/caregiver, ordering medications, tests, or procedures, referring and communicating with other health care professionals , documenting  information in the medical record, independently interpreting results and communicating that information with the patient/family/caregiver, care coordination, and extensive education, counseling, and management of multiple new and chronic active diagnoses all addressed today  Follow Up   Return in about 3 months (around 4/21/2025).  Patient was given instructions and counseling regarding her condition or for health maintenance advice. Please see specific information pulled into the AVS if appropriate.

## 2025-01-22 LAB
BUN SERPL-MCNC: 17 MG/DL (ref 8–27)
BUN/CREAT SERPL: 15 (ref 12–28)
CALCIUM SERPL-MCNC: 9.3 MG/DL (ref 8.7–10.3)
CHLORIDE SERPL-SCNC: 103 MMOL/L (ref 96–106)
CO2 SERPL-SCNC: 25 MMOL/L (ref 20–29)
CREAT SERPL-MCNC: 1.17 MG/DL (ref 0.57–1)
EGFRCR SERPLBLD CKD-EPI 2021: 48 ML/MIN/1.73
GLUCOSE SERPL-MCNC: 90 MG/DL (ref 70–99)
POTASSIUM SERPL-SCNC: 4.3 MMOL/L (ref 3.5–5.2)
SODIUM SERPL-SCNC: 142 MMOL/L (ref 134–144)
TSH SERPL DL<=0.005 MIU/L-ACNC: 1.95 UIU/ML (ref 0.45–4.5)

## 2025-02-24 ENCOUNTER — TELEPHONE (OUTPATIENT)
Dept: FAMILY MEDICINE CLINIC | Facility: CLINIC | Age: 75
End: 2025-02-24
Payer: MEDICARE

## 2025-02-24 NOTE — TELEPHONE ENCOUNTER
Caller: Yeimi Larson    Relationship: Self    Best call back number: [phone]*    What is the best time to reach you: ANYTIME    Who are you requesting to speak with (clinical staff, provider,  specific staff member): CLINICAL    What was the call regarding: PATIENT CALLING REQUESTING A CALL BACK WITH INSURANCE STATUS OF THE BRAIN MRI ORDER, AND THE HEART MONITOR ORDER. THE PATIENT STATES THAT SHE HAS ONLY RECEIVED NOTIFICATION THAT THE ULTRASOUND OF THE HEART HAS BEEN APPROVED BY HER INSURANCE.

## 2025-02-27 ENCOUNTER — HOSPITAL ENCOUNTER (OUTPATIENT)
Dept: MRI IMAGING | Facility: HOSPITAL | Age: 75
Discharge: HOME | End: 2025-02-27
Payer: MEDICARE

## 2025-02-27 ENCOUNTER — HOSPITAL ENCOUNTER (OUTPATIENT)
Dept: CARDIOLOGY | Facility: HOSPITAL | Age: 75
Discharge: HOME | End: 2025-02-27
Payer: MEDICARE

## 2025-02-27 VITALS
SYSTOLIC BLOOD PRESSURE: 177 MMHG | HEART RATE: 74 BPM | HEIGHT: 64 IN | BODY MASS INDEX: 23.22 KG/M2 | WEIGHT: 136 LBS | DIASTOLIC BLOOD PRESSURE: 78 MMHG

## 2025-02-27 DIAGNOSIS — R42 DIZZINESS: ICD-10-CM

## 2025-02-27 DIAGNOSIS — R55 NEAR SYNCOPE: ICD-10-CM

## 2025-02-27 LAB
AORTIC ARCH: 2.8 CM
AORTIC DIMENSIONLESS INDEX: 0.7 (DI)
ASCENDING AORTA: 3.3 CM
AV MEAN PRESS GRAD SYS DOP V1V2: 8.8 MMHG
AV VMAX SYS DOP: 196.8 CM/SEC
BH CV ECHO MEAS - ACS: 1.68 CM
BH CV ECHO MEAS - AO MAX PG: 15.5 MMHG
BH CV ECHO MEAS - AO ROOT DIAM: 3 CM
BH CV ECHO MEAS - AO V2 VTI: 46.7 CM
BH CV ECHO MEAS - AVA(I,D): 2.09 CM2
BH CV ECHO MEAS - EDV(CUBED): 60.8 ML
BH CV ECHO MEAS - EDV(MOD-SP2): 70 ML
BH CV ECHO MEAS - EDV(MOD-SP4): 69 ML
BH CV ECHO MEAS - EF(MOD-SP2): 65.7 %
BH CV ECHO MEAS - EF(MOD-SP4): 65.2 %
BH CV ECHO MEAS - ESV(CUBED): 18 ML
BH CV ECHO MEAS - ESV(MOD-SP2): 24 ML
BH CV ECHO MEAS - ESV(MOD-SP4): 24 ML
BH CV ECHO MEAS - FS: 33.3 %
BH CV ECHO MEAS - IVS/LVPW: 0.93 CM
BH CV ECHO MEAS - IVSD: 0.85 CM
BH CV ECHO MEAS - LA DIMENSION: 4.3 CM
BH CV ECHO MEAS - LAT PEAK E' VEL: 6.4 CM/SEC
BH CV ECHO MEAS - LV DIASTOLIC VOL/BSA (35-75): 41.6 CM2
BH CV ECHO MEAS - LV MASS(C)D: 103.9 GRAMS
BH CV ECHO MEAS - LV MAX PG: 6.9 MMHG
BH CV ECHO MEAS - LV MEAN PG: 3.6 MMHG
BH CV ECHO MEAS - LV SYSTOLIC VOL/BSA (12-30): 14.5 CM2
BH CV ECHO MEAS - LV V1 MAX: 131.5 CM/SEC
BH CV ECHO MEAS - LV V1 VTI: 32.7 CM
BH CV ECHO MEAS - LVIDD: 3.9 CM
BH CV ECHO MEAS - LVIDS: 2.6 CM
BH CV ECHO MEAS - LVOT AREA: 3 CM2
BH CV ECHO MEAS - LVOT DIAM: 1.95 CM
BH CV ECHO MEAS - LVPWD: 0.91 CM
BH CV ECHO MEAS - MED PEAK E' VEL: 5.9 CM/SEC
BH CV ECHO MEAS - MV A DUR: 0.15 SEC
BH CV ECHO MEAS - MV A MAX VEL: 122.6 CM/SEC
BH CV ECHO MEAS - MV DEC SLOPE: 547.1 CM/SEC2
BH CV ECHO MEAS - MV DEC TIME: 0.2 SEC
BH CV ECHO MEAS - MV E MAX VEL: 107 CM/SEC
BH CV ECHO MEAS - MV E/A: 0.87
BH CV ECHO MEAS - MV MAX PG: 6 MMHG
BH CV ECHO MEAS - MV MEAN PG: 2.9 MMHG
BH CV ECHO MEAS - MV P1/2T: 64.9 MSEC
BH CV ECHO MEAS - MV V2 VTI: 34.1 CM
BH CV ECHO MEAS - MVA(P1/2T): 3.4 CM2
BH CV ECHO MEAS - MVA(VTI): 2.9 CM2
BH CV ECHO MEAS - PA ACC TIME: 0.15 SEC
BH CV ECHO MEAS - PA V2 MAX: 94.1 CM/SEC
BH CV ECHO MEAS - PULM A REVS DUR: 0.13 SEC
BH CV ECHO MEAS - PULM A REVS VEL: 33.2 CM/SEC
BH CV ECHO MEAS - PULM DIAS VEL: 44.2 CM/SEC
BH CV ECHO MEAS - PULM S/D: 1.33
BH CV ECHO MEAS - PULM SYS VEL: 58.8 CM/SEC
BH CV ECHO MEAS - QP/QS: 1.26
BH CV ECHO MEAS - RAP SYSTOLE: 3 MMHG
BH CV ECHO MEAS - RV MAX PG: 2.7 MMHG
BH CV ECHO MEAS - RV V1 MAX: 82.8 CM/SEC
BH CV ECHO MEAS - RV V1 VTI: 18.5 CM
BH CV ECHO MEAS - RVDD: 1.64 CM
BH CV ECHO MEAS - RVOT DIAM: 2.9 CM
BH CV ECHO MEAS - RVSP: 30.8 MMHG
BH CV ECHO MEAS - SUP REN AO DIAM: 1.7 CM
BH CV ECHO MEAS - SV(LVOT): 97.8 ML
BH CV ECHO MEAS - SV(MOD-SP2): 46 ML
BH CV ECHO MEAS - SV(MOD-SP4): 45 ML
BH CV ECHO MEAS - SV(RVOT): 123.6 ML
BH CV ECHO MEAS - SVI(LVOT): 58.9 ML/M2
BH CV ECHO MEAS - SVI(MOD-SP2): 27.7 ML/M2
BH CV ECHO MEAS - SVI(MOD-SP4): 27.1 ML/M2
BH CV ECHO MEAS - TAPSE (>1.6): 2.17 CM
BH CV ECHO MEAS - TR MAX PG: 27.8 MMHG
BH CV ECHO MEAS - TR MAX VEL: 263.8 CM/SEC
BH CV ECHO MEASUREMENTS AVERAGE E/E' RATIO: 17.4
BH CV XLRA - RV BASE: 3.1 CM
BH CV XLRA - RV LENGTH: 5.2 CM
BH CV XLRA - RV MID: 2.6 CM
BH CV XLRA - TDI S': 17.1 CM/SEC
LEFT ATRIUM VOLUME INDEX: 23.7 ML/M2
LV EF BIPLANE MOD: 66.2 %
SINUS: 3 CM
STJ: 2.9 CM

## 2025-02-27 PROCEDURE — 93246 EXT ECG>7D<15D RECORDING: CPT

## 2025-02-27 PROCEDURE — 25510000002 GADOBENATE DIMEGLUMINE 529 MG/ML SOLUTION: Performed by: FAMILY MEDICINE

## 2025-02-27 PROCEDURE — 70553 MRI BRAIN STEM W/O & W/DYE: CPT

## 2025-02-27 PROCEDURE — A9577 INJ MULTIHANCE: HCPCS | Performed by: FAMILY MEDICINE

## 2025-02-27 PROCEDURE — 25510000001 PERFLUTREN 6.52 MG/ML SUSPENSION 2 ML VIAL: Performed by: FAMILY MEDICINE

## 2025-02-27 PROCEDURE — 93306 TTE W/DOPPLER COMPLETE: CPT

## 2025-02-27 RX ADMIN — GADOBENATE DIMEGLUMINE 12 ML: 529 INJECTION, SOLUTION INTRAVENOUS at 07:12

## 2025-02-27 RX ADMIN — PERFLUTREN 2 ML: 6.52 INJECTION, SUSPENSION INTRAVENOUS at 08:17

## 2025-03-15 LAB
CV ZIO BASELINE AVG BPM: 70 BPM
CV ZIO BASELINE BPM HIGH: 141 BPM
CV ZIO BASELINE BPM LOW: 51 BPM
CV ZIO DEVICE ANALYSIS TIME: NORMAL
CV ZIO ECT SVE COUNT: NORMAL EPISODES
CV ZIO ECT SVE CPLT COUNT: 3612 EPISODES
CV ZIO ECT SVE CPLT FREQ: NORMAL
CV ZIO ECT SVE FREQ: NORMAL
CV ZIO ECT SVE TPLT COUNT: 3632 EPISODES
CV ZIO ECT SVE TPLT FREQ: NORMAL
CV ZIO ECT VE COUNT: 102 EPISODES
CV ZIO ECT VE CPLT COUNT: 1 EPISODES
CV ZIO ECT VE CPLT FREQ: NORMAL
CV ZIO ECT VE FREQ: NORMAL
CV ZIO ECT VE TPLT COUNT: 0 EPISODES
CV ZIO ECT VE TPLT FREQ: 0
CV ZIO ECTOPIC SVE COUPLET RAW PERCENT: 0.66 %
CV ZIO ECTOPIC SVE ISOLATED PERCENT: 2.13 %
CV ZIO ECTOPIC SVE TRIPLET RAW PERCENT: 1 %
CV ZIO ECTOPIC VE COUPLET RAW PERCENT: 0 %
CV ZIO ECTOPIC VE ISOLATED PERCENT: 0.01 %
CV ZIO ECTOPIC VE TRIPLET RAW PERCENT: 0 %
CV ZIO ENROLLMENT END: NORMAL
CV ZIO ENROLLMENT START: NORMAL
CV ZIO PATIENT EVENTS DIARIES: 0
CV ZIO PATIENT EVENTS TRIGGERS: 3
CV ZIO PAUSE COUNT: 0
CV ZIO PRESCRIPTION STATUS: NORMAL
CV ZIO SVT AVG BPM: 113 BPM
CV ZIO SVT BPM HIGH: 141 BPM
CV ZIO SVT BPM LOW: 68 BPM
CV ZIO SVT COUNT: 2123
CV ZIO SVT F EPI AVG BPM: 133 BPM
CV ZIO SVT F EPI BEATS: 4 BEATS
CV ZIO SVT F EPI BPM HIGH: 141 BPM
CV ZIO SVT F EPI BPM LOW: 119 BPM
CV ZIO SVT F EPI DUR: 2 SEC
CV ZIO SVT F EPI END: NORMAL
CV ZIO SVT F EPI START: NORMAL
CV ZIO SVT L EPI AVG BPM: 107 BPM
CV ZIO SVT L EPI BEATS: 19 BEATS
CV ZIO SVT L EPI BPM HIGH: 115 BPM
CV ZIO SVT L EPI BPM LOW: 68 BPM
CV ZIO SVT L EPI DUR: 10.8 SEC
CV ZIO SVT L EPI END: NORMAL
CV ZIO SVT L EPI START: NORMAL
CV ZIO TOTAL  ENROLLMENT PERIOD: NORMAL
CV ZIO VT COUNT: 0

## 2025-04-22 ENCOUNTER — OFFICE VISIT (OUTPATIENT)
Dept: FAMILY MEDICINE CLINIC | Facility: CLINIC | Age: 75
End: 2025-04-22
Payer: MEDICARE

## 2025-04-22 VITALS
HEIGHT: 64 IN | TEMPERATURE: 98.4 F | DIASTOLIC BLOOD PRESSURE: 70 MMHG | BODY MASS INDEX: 23.46 KG/M2 | SYSTOLIC BLOOD PRESSURE: 104 MMHG | HEART RATE: 74 BPM | OXYGEN SATURATION: 98 % | WEIGHT: 137.4 LBS

## 2025-04-22 DIAGNOSIS — N18.2 STAGE 2 CHRONIC KIDNEY DISEASE: ICD-10-CM

## 2025-04-22 DIAGNOSIS — R63.4 WEIGHT LOSS: ICD-10-CM

## 2025-04-22 DIAGNOSIS — I67.9 SMALL VESSEL DISEASE, CEREBROVASCULAR: ICD-10-CM

## 2025-04-22 DIAGNOSIS — F41.8 ANXIETY WITH DEPRESSION: ICD-10-CM

## 2025-04-22 DIAGNOSIS — Z78.9 MEDICALLY COMPLEX PATIENT: ICD-10-CM

## 2025-04-22 DIAGNOSIS — E78.5 HYPERLIPIDEMIA, UNSPECIFIED HYPERLIPIDEMIA TYPE: ICD-10-CM

## 2025-04-22 DIAGNOSIS — R55 NEAR SYNCOPE: Primary | ICD-10-CM

## 2025-04-22 DIAGNOSIS — E55.9 VITAMIN D DEFICIENCY: ICD-10-CM

## 2025-04-22 DIAGNOSIS — M47.814 THORACIC SPONDYLOSIS: ICD-10-CM

## 2025-04-22 DIAGNOSIS — R42 LIGHTHEADEDNESS: ICD-10-CM

## 2025-04-22 DIAGNOSIS — M79.89 LEFT LEG SWELLING: ICD-10-CM

## 2025-04-22 RX ORDER — TRAMADOL HYDROCHLORIDE 50 MG/1
50 TABLET ORAL EVERY 8 HOURS PRN
Qty: 40 TABLET | Refills: 2 | Status: SHIPPED | OUTPATIENT
Start: 2025-04-22

## 2025-04-22 NOTE — ASSESSMENT & PLAN NOTE
Seeing [facility] neurologist/Dr. Redd  2024--carotid ultrasound with mild atherosclerotic disease only, no stenosis

## 2025-04-22 NOTE — PATIENT INSTRUCTIONS
Needs Doppler left lower extremity, will schedule to rule out blood clot    Referral to cardiologist to evaluate for POTS    Caution with changes in position, stay well-hydrated, may add salt to food.  Consider wearing an abdominal binder    Please get fasting labs done within the next 2 weeks    Decrease Lexapro to 10 mg daily

## 2025-04-22 NOTE — PROGRESS NOTES
"Chief Complaint  Dizziness (Follow up last visit and testing), Depression, Thoracic spondylosis, Hyperlipidemia, and Edema (Left leg for a while. )    3-month follow-up for chronic controlled med refill/thoracic spondylosis, weight loss, dizzy episodes/near syncopal workup, and with new complaints and concerns regarding left lower extremity swelling  And  6-month follow-up on hyperlipidemia, INGRIS/depression, vitamin D deficiency, CKD    Last visit with me in January 2025 for chronic med refills, concerns for weight loss, and persistent episodes of dizziness/lightheadedness/near syncopal  Multiple things done at last visit.  --Some mild weight loss.  Asymptomatic.  Thought to be stress related/moving to new home.  -- Given new concerns for lightheadedness/near syncopal episodes when standing only workup pursued.  -- Ordered MRI of head, Zio patch, and 2D echo.  S/P carotid ultrasound completed by neurologist at recent appointment.  --Checked some screening labs given mild weight loss and dizziness.    She was compliant with all the above testing.  Here to follow-up on multiple studies and still with persistent symptoms, along with new complaints.      For the most part, continues to do well.  Recently moved into a new condo.  Mood remains good..  Sleep has been adequate.  Still tries to maintain a healthy well-balanced diet and regular cardio exercise.  Weight loss has resolved.       Today, still complains about episodes of \"lightheadedness/dizziness.\"  Has been experiencing intermittent episodes of lightheadedness x 1 year or longer now.  This occurs very sporadically, only happens when she is standing.  Does not occur at rest or changing positions.  Gets this feeling of lightheadedness, \"almost like going to pass out.\"  No actual syncope, no chest pain, no shortness of air, no headache, no nausea or vomiting.  Despite taking less tramadol and completely off Mobic and all anti-inflammatories, still experiencing " "these lightheadedness/dizziness episodes.  Recently completed multiple studies ordered at last visit.  Here to review.     She did discuss this with her neurologist at [facility]/Dr. Redd almost 1 year ago.  Seen [facility] neurologist/Dr. Redd on 3/18/2024, records reviewed again --- seen in consultation for dizziness, tremor, memory concerns.  MoCA testing 28 out of 30, no concerns for dementia.  Thought some mild memory impairment may be due to her tramadol?  Suggested taking less tramadol and reevaluating if things become worse with an MRI of brain and neuropsychological testing.  Carotid Doppler of neck was ordered and plaque only, no stenosis.  Tremor thought to be secondary to benign essential tremor only.  No treatment initiated.    Essentially no change but these intermittent dizzy/lightheaded/near syncopal episodes that she only experiences when standing.  Concerned she may have POTS?  As she has noticed if she is wearing a back brace for her chronic back pain, never gets symptomatic.    Also would like to further discuss few other issues.  #1) desires to decrease her Lexapro or possibly come off of it entirely?  Feels like she is doing much better in terms of anxiety/depression.  Things have settled down.  Currently on Lexapro 20 mg daily.    #2) concerned about some left lower extremity swelling.  States this has been going on \"for awhile.\"  Like about 6 months?  Has swelling in both feet/ankles/lower extremities but left is worse than right.  No pain.  Does not affect her walking.       Needs chronic med refills.  Due for 6-month fasting labs, although not fasting today.  Compliant with and tolerates all medications without side effects.       Still maintained on tramadol quantity of 1/day (may take second dose rarely) for chronic diagnosis of multiple joint OA and thoracic spondylosis.  This allows adequate pain relief, enjoyment in life, and provides the ability to maintain general activity level.  " "There has been no change in history that would increase the risk of overdose or other adverse events.            Review of Systems   Constitutional:  Negative for fever and unexpected weight change.   Respiratory:  Negative for cough and shortness of breath.    Cardiovascular:  Negative for chest pain.        Subjective          Yeimi Larson presents to [facility]    Objective   Vital Signs:   Vitals:    04/22/25 1125   BP: 104/70   BP Location: Left arm   Patient Position: Sitting   Cuff Size: Adult   Pulse: 74   Temp: 98.4 °F (36.9 °C)   SpO2: 98%   Weight: 62.3 kg (137 lb 6.4 oz)   Height: 162.6 cm (64\")      Body mass index is 23.58 kg/m².   Physical Exam  Vitals and nursing note reviewed.   Constitutional:       General: She is not in acute distress.     Appearance: Normal appearance. She is well-developed and normal weight. She is not ill-appearing.   HENT:      Head: Normocephalic and atraumatic.      Nose: Nose normal.   Eyes:      Conjunctiva/sclera: Conjunctivae normal.      Pupils: Pupils are equal, round, and reactive to light.   Neck:      Thyroid: No thyromegaly.      Vascular: No carotid bruit.   Cardiovascular:      Rate and Rhythm: Normal rate and regular rhythm.      Heart sounds: Normal heart sounds. No murmur heard.     Comments: Bilateral lower extremities --- trace to 1+ edema, more pedal edema, left greater than right.  Negative Homans.  Pulmonary:      Effort: Pulmonary effort is normal. No respiratory distress.      Breath sounds: Normal breath sounds. No wheezing or rales.   Abdominal:      General: Abdomen is flat. Bowel sounds are normal. There is no distension.      Palpations: Abdomen is soft. There is no hepatomegaly, splenomegaly or mass.      Tenderness: There is no abdominal tenderness. There is no guarding or rebound.      Hernia: No hernia is present.   Musculoskeletal:         General: Normal range of motion.      Cervical back: Normal range of " motion and neck supple.      Right lower leg: Edema present.      Left lower leg: Edema present.   Lymphadenopathy:      Cervical: No cervical adenopathy.   Skin:     General: Skin is warm.   Neurological:      General: No focal deficit present.      Mental Status: She is alert.   Psychiatric:         Mood and Affect: Mood normal.         Behavior: Behavior normal.         Thought Content: Thought content normal.         Judgment: Judgment normal.        Result Review :     Common labs          9/27/2024    13:41 1/21/2025    12:26 4/24/2025    08:28   Common Labs   Glucose 79  90  83    BUN 30  17  19    Creatinine 1.23  1.17  1.11    Sodium 139  142  141    Potassium 4.4  4.3  4.3    Chloride 102  103  104    Calcium 9.5  9.3  9.4    Albumin 4.3   4.3    Total Bilirubin 0.4   0.7    Alkaline Phosphatase 61   74    AST (SGOT) 17   18    ALT (SGPT) 14   10    WBC 8.4      Hemoglobin 12.3      Hematocrit 39.2      Platelets 302      Total Cholesterol 151   139    Triglycerides 108   84    HDL Cholesterol 73   61    LDL Cholesterol  59   62      TSH          9/27/2024    13:41 1/21/2025    12:26   TSH   TSH 4.270  1.950          Lab Results   Component Value Date    LRUW12SD 44.5 09/27/2024    FOLATE 6.37 03/10/2023      MRI Brain With & Without Contrast (02/27/2025 07:22) --mild to moderate small vessel disease  Holter Monitor - 72 Hour Up To 15 Days (02/27/2025 08:42) normal  Adult Transthoracic Echo Complete W/ Cont if Necessary Per Protocol (02/27/2025 08:16) --EF 66%, mild valvular disease only         Assessment and Plan    Diagnoses and all orders for this visit:    1. Near syncope (Primary) --new Dx, persistent/intermittent episodes  Only occur when standing.  See above HPI.  S/P completion of MRI of head, carotid ultrasound, echo, and Zio patch all unremarkable except for mild cerebrovascular small vessel disease.  S/P orthostatics completed at last visit, negative.  Concern for possible POTS?  Referral to  cardiology for further evaluation and treatment  At this time, recommend staying well-hydrated.  May consider wearing abdominal binder  -     Ambulatory Referral to Cardiology    2. Lightheadedness --- uncontrolled, same plan as above  Assessment & Plan:  Seen [facility] neurologist/Dr. Redd  2024--carotid ultrasound with mild atherosclerotic disease only, no stenosis    Orders:  -     Ambulatory Referral to Cardiology    3. Small vessel disease, cerebrovascular --new Dx  Mild.  Age related.  Counseling and education done.  Could be a component to her intermittent dizziness episodes?  Continue with aggressive risk factor control, continue statin    4. Hyperlipidemia, unspecified hyperlipidemia type controlled --  Due to recheck lipids, CMP.  Orders placed.  Nonfasting today.  Goal LDL needs remain less than 100 given chronic small vessel disease  If at goal then no change with Crestor 10 mg daily, otherwise may need to increase to 20 mg?  Continue with healthy lifestyle --Needs low-carb/low calorie/low cholesterol diet and increase cardio exercise to greater than 150 minutes weekly  -     Comprehensive Metabolic Panel; Future  -     Lipid Panel With LDL / HDL Ratio; Future    5. Thoracic spondylosis --controlled  No change with tramadol, refill as directed below  Urine Tox screen and controlled substance contract up-to-date/September 2024 appropriate.  VIPIN/ INSPECT report reviewed, appropriate.  Adequate pain relief, good medication adherence, and low risk behaviors for misuse.  -     traMADol (ULTRAM) 50 MG tablet; Take 1 tablet by mouth Every 8 (Eight) Hours As Needed for Moderate Pain.  Dispense: 40 tablet; Refill: 2    6. Stage 2 chronic kidney disease --stable  Remain off NSAIDs.  Continue to monitor renal function  -     Comprehensive Metabolic Panel; Future    7. Vitamin D deficiency --controlled    8. Weight loss --resolved    9. Anxiety with depression --controlled  Given doing well and stressors have  settled down.  Desires to decrease her Lexapro to 10 mg daily.  May consider weaning off entirely?    10. Left leg swelling --new problem  Has been going on for several months.  Do suspect more dependent edema?  But given left calf greater than right, will check venous Doppler.  -     Duplex Venous Lower Extremity - Left CAR; Future    11. Medically complex patient -- See all the above active chronic diagnoses that require close monitoring and longitudinal care.      I spent 48 minutes caring for Yeimi on this date of service. This time includes time spent by me in the following activities:preparing for the visit, reviewing tests, obtaining and/or reviewing a separately obtained history, performing a medically appropriate examination and/or evaluation , counseling and educating the patient/family/caregiver, ordering medications, tests, or procedures, referring and communicating with other health care professionals , documenting information in the medical record, independently interpreting results and communicating that information with the patient/family/caregiver, care coordination, and detailed education, counseling, and management of multiple new and chronic active high risk diagnoses all addressed today x 10 diagnosis  Follow Up   Return in about 3 months (around 7/22/2025) for Medicare Wellness, Recheck.  Patient was given instructions and counseling regarding her condition or for health maintenance advice. Please see specific information pulled into the AVS if appropriate.

## 2025-04-25 LAB
ALBUMIN SERPL-MCNC: 4.3 G/DL (ref 3.8–4.8)
ALP SERPL-CCNC: 74 IU/L (ref 44–121)
ALT SERPL-CCNC: 10 IU/L (ref 0–32)
AST SERPL-CCNC: 18 IU/L (ref 0–40)
BILIRUB SERPL-MCNC: 0.7 MG/DL (ref 0–1.2)
BUN SERPL-MCNC: 19 MG/DL (ref 8–27)
BUN/CREAT SERPL: 17 (ref 12–28)
CALCIUM SERPL-MCNC: 9.4 MG/DL (ref 8.7–10.3)
CHLORIDE SERPL-SCNC: 104 MMOL/L (ref 96–106)
CHOLEST SERPL-MCNC: 139 MG/DL (ref 100–199)
CO2 SERPL-SCNC: 24 MMOL/L (ref 20–29)
CREAT SERPL-MCNC: 1.11 MG/DL (ref 0.57–1)
EGFRCR SERPLBLD CKD-EPI 2021: 52 ML/MIN/1.73
GLOBULIN SER CALC-MCNC: 2.2 G/DL (ref 1.5–4.5)
GLUCOSE SERPL-MCNC: 83 MG/DL (ref 70–99)
HDLC SERPL-MCNC: 61 MG/DL
LDLC SERPL CALC-MCNC: 62 MG/DL (ref 0–99)
LDLC/HDLC SERPL: 1 RATIO (ref 0–3.2)
POTASSIUM SERPL-SCNC: 4.3 MMOL/L (ref 3.5–5.2)
PROT SERPL-MCNC: 6.5 G/DL (ref 6–8.5)
SODIUM SERPL-SCNC: 141 MMOL/L (ref 134–144)
TRIGL SERPL-MCNC: 84 MG/DL (ref 0–149)
VLDLC SERPL CALC-MCNC: 16 MG/DL (ref 5–40)

## 2025-04-26 PROBLEM — M79.89 LEFT LEG SWELLING: Status: ACTIVE | Noted: 2025-04-26

## 2025-05-01 ENCOUNTER — OFFICE VISIT (OUTPATIENT)
Dept: CARDIOLOGY | Facility: CLINIC | Age: 75
End: 2025-05-01
Payer: MEDICARE

## 2025-05-01 VITALS
DIASTOLIC BLOOD PRESSURE: 82 MMHG | HEART RATE: 73 BPM | BODY MASS INDEX: 22.88 KG/M2 | HEIGHT: 64 IN | WEIGHT: 134 LBS | OXYGEN SATURATION: 99 % | SYSTOLIC BLOOD PRESSURE: 128 MMHG

## 2025-05-01 DIAGNOSIS — R42 LIGHTHEADED: ICD-10-CM

## 2025-05-01 DIAGNOSIS — I49.1 PREMATURE ATRIAL CONTRACTION: Primary | ICD-10-CM

## 2025-05-01 DIAGNOSIS — R42 DIZZINESS: ICD-10-CM

## 2025-05-01 DIAGNOSIS — I67.9 SMALL VESSEL DISEASE, CEREBROVASCULAR: ICD-10-CM

## 2025-05-01 DIAGNOSIS — E78.5 HYPERLIPIDEMIA, UNSPECIFIED HYPERLIPIDEMIA TYPE: ICD-10-CM

## 2025-05-01 PROCEDURE — 93000 ELECTROCARDIOGRAM COMPLETE: CPT | Performed by: STUDENT IN AN ORGANIZED HEALTH CARE EDUCATION/TRAINING PROGRAM

## 2025-05-01 PROCEDURE — 99204 OFFICE O/P NEW MOD 45 MIN: CPT | Performed by: STUDENT IN AN ORGANIZED HEALTH CARE EDUCATION/TRAINING PROGRAM

## 2025-05-01 RX ORDER — CETIRIZINE HYDROCHLORIDE 10 MG/1
10 TABLET ORAL DAILY
COMMUNITY

## 2025-05-01 NOTE — PROGRESS NOTES
[facility]    Subjective:     Encounter Date:05/01/25      Patient ID: Yeiim Larson is a 74 y.o. female.    Chief Complaint:   Chief Complaint   Patient presents with    Loss of Consciousness      History of Present Illness    Yeimi Larson is a 74 y.o. lady who presents for further cardiac evaluation for complaints of near syncope and lightheadedness.    She has a past medical history of hyperlipidemia, as well as thoracic spondylosis.  She underwent a comprehensive evaluation with her PCP February 27, 2025.  It showed normal LVEF of 66%, some age-related valvular changes but nothing else of significance.  A Holter monitor was obtained for 11 days, which returned normal.  There were no significant tachycardia or bradycardia arrhythmiasThere were 3 patient triggered events and they all correlated with normal rhythm.  There were short atrial runs but no accompanying symptoms.    She is referred here today for opinion on cardiac etiology of her symptoms.    She reports a few different episodes of this lightheadedness that will occur randomly when she is standing for prolonged period of time.  Not particularly exertional, no significant other symptoms like chest pain or shortness of breath.  Intermittently, she will have a left-sided chest discomfort that sometimes changes when she moves her left arm around and touches the left side of her chest.  She has no significant exertional dyspnea.    Previous Cardiac Testing:  She underwent echocardiogram    The following portions of the patient's history were reviewed and updated as appropriate: allergies, current medications, past family history, past medical history, past social history, past surgical history and problem list.    Past Medical History:   Diagnosis Date    Arthritis     Cataract 2021    Depression     Diverticulitis     High risk medication use 08/31/2020    Osteopenia 2003    Osteoporosis     PONV (postoperative nausea and vomiting)   "   Positive colorectal cancer screening using Cologuard test 12/15/2020       Past Surgical History:   Procedure Laterality Date    BREAST BIOPSY Left     benign findings approx 15 years ago    COLONOSCOPY N/A 2006    Per patient c-scope was normal, done at [facility]    COLONOSCOPY N/A 01/06/2021    Procedure: COLONOSCOPY to Cecum;  Surgeon: Chinedu Ball MD;  Location: [facility] ENDOSCOPY;  Service: General;  Laterality: N/A;  Hx of + Cologuard  Diverticulosis    HYSTERECTOMY Bilateral 1979    OOPHORECTOMY      TONSILLECTOMY Bilateral            ECG 12 Lead    Date/Time: 5/1/2025 11:51 AM  Performed by: Obi Franklin MD    Authorized by: Obi Franklin MD  Comparison: not compared with previous ECG   Previous ECG: no previous ECG available  Rhythm: sinus rhythm  Rate: normal  Conduction: conduction normal  ST Segments: ST segments normal  T Waves: T waves normal  QRS axis: normal  Other: no other findings    Clinical impression: normal ECG             Objective:     Vitals:    05/01/25 1146   BP: 128/82   Pulse: 73   SpO2: 99%   Weight: 60.8 kg (134 lb)   Height: 162.6 cm (64\")         Constitutional:       Appearance: Healthy appearance. Not in distress.   Neck:      Vascular: JVD normal.   Pulmonary:      Effort: Pulmonary effort is normal.      Breath sounds: Normal breath sounds.   Cardiovascular:      PMI at left midclavicular line. Normal rate. Regular rhythm. Normal S2.       Murmurs: There is no murmur.   Pulses:     Intact distal pulses.   Edema:     Peripheral edema absent.   Skin:     General: Skin is warm and dry.   Neurological:      General: No focal deficit present.      Mental Status: Alert, oriented to person, place, and time and oriented to person, place and time.   Psychiatric:         Mood and Affect: Mood and affect normal.         Lab Review:     Lipid Panel          9/27/2024    13:41 4/24/2025    08:28   Lipid Panel   Total Cholesterol 151  139    Triglycerides 108  84    HDL Cholesterol " 73  61    VLDL Cholesterol 19  16    LDL Cholesterol  59  62    LDL/HDL Ratio 0.8  1.0      BUN   Date Value Ref Range Status   04/24/2025 19 8 - 27 mg/dL Final     Creatinine   Date Value Ref Range Status   04/24/2025 1.11 (H) 0.57 - 1.00 mg/dL Final     Potassium   Date Value Ref Range Status   04/24/2025 4.3 3.5 - 5.2 mmol/L Final     ALT (SGPT)   Date Value Ref Range Status   04/24/2025 10 0 - 32 IU/L Final     AST (SGOT)   Date Value Ref Range Status   04/24/2025 18 0 - 40 IU/L Final         Performed        Assessment:          Diagnosis Plan   1. Premature atrial contraction  ECG 12 Lead      2. Lightheaded  ECG 12 Lead      3. Dizziness        4. Small vessel disease, cerebrovascular        5. Hyperlipidemia, unspecified hyperlipidemia type               Plan:         Chest discomfort.  Atypical.Probably referred from her thoracic spinal disease or musculoskeletal given worsens with direct palpation.  Patient was reassured.  Intermittent episodic dizziness: Occurs after prolonged standing.  She had a spell when she wore the monitor but there were no arrhythmia correlates.  Of note, there were no significant tachycardia spells noted on her monitor.This excludes POTS.  Patient was reassured.  I am not sure I have a cardiovascular contribution of this.  Her echo is structurally normal, her heart monitor had some age-related arrhythmias but nothing significant and nothing that correlated with her symptoms.  We discussed tilt table testing, but she has not passed out, and her symptoms sound more just related to venous pooling, we discussed leg exercises and compression stockings if needed.  Will hold off on tilt table for now.  She will try to increase her electrolyte intake with electrolyte beverages in the interim  Can reconsider the need for tilt table test if any overt syncopal episodes occur  Thoracic spondylosis: There may be more of a neuro-autonomic cause of her intermittent lightheadedness related to  spinal or neurologic issues as I do not really see a cardiovascular contribution here.  Left leg swelling.  Per PCP.  Echo had no CHF.     Thank you for allowing me to participate in the care of Yeimi Larson. Feel free to contact me directly with any further questions or concerns.    RTC as needed.  Further recommendations per above.    Obi Franklin MD  [facility]  05/01/25  11:36 EDT       Current Outpatient Medications:     cetirizine (zyrTEC) 10 MG tablet, Take 1 tablet by mouth Daily., Disp: , Rfl:     cholecalciferol (VITAMIN D3) 1000 units tablet, Take 1 tablet by mouth Daily., Disp: , Rfl:     escitalopram (LEXAPRO) 20 MG tablet, Take 1 tablet by mouth Daily., Disp: 90 tablet, Rfl: 1    ibandronate (BONIVA) 150 MG tablet, Take 1 tablet by mouth Every 30 (Thirty) Days., Disp: 11 tablet, Rfl: 0    traMADol (ULTRAM) 50 MG tablet, Take 1 tablet by mouth Every 8 (Eight) Hours As Needed for Moderate Pain., Disp: 40 tablet, Rfl: 2    vitamin B-12 (CYANOCOBALAMIN) 2500 MCG sublingual tablet tablet, Place 2,500 mcg under the tongue Daily., Disp: , Rfl:     rosuvastatin (CRESTOR) 10 MG tablet, Take 1 tablet by mouth Daily., Disp: , Rfl:          Return if symptoms worsen or fail to improve.      Part of this note may be an electronic transcription/translation of spoken language to printed text using the Dragon Dictation System.

## 2025-05-01 NOTE — PATIENT INSTRUCTIONS
Thankfully, your heart and vascular testing is checked out okay so far.  Your ultrasound your heart has some age-related changes to some heart valves, but nothing to make you feel lightheaded or close to passing out.    Your heart monitor during the triggered event did correlate with a normal heart rhythm.  There were no slow or fast heartbeats that were detected that would explain any lightheadedness which is reassuring.    Overall, I do not really see a cardiovascular contribution to explain what would cause any of the lightheadedness but in the meantime I would recommend to try to increase electrolyte beverages like Gatorade's, or liquid IVs particularly if you have any day when you are feeling dizzy, try to drink at least 1 or 2 Gatorade's that day.  You do not have to force yourself to drink these all the time, just drink when you are thirsty but also make sure you maintain hydration especially on days when you will be outside or walking for longer distances.    If your symptoms get worse, or the diagnosis remains unclear, let us know and we can get you arranged for a tilt table test which can more formally establish a diagnosis, but I do not think that is needed right now.

## 2025-05-08 ENCOUNTER — HOSPITAL ENCOUNTER (OUTPATIENT)
Dept: CARDIOLOGY | Facility: HOSPITAL | Age: 75
Discharge: HOME | End: 2025-05-08
Admitting: FAMILY MEDICINE
Payer: MEDICARE

## 2025-05-08 DIAGNOSIS — M79.89 LEFT LEG SWELLING: ICD-10-CM

## 2025-05-08 LAB
BH CV LOWER VASCULAR LEFT COMMON FEMORAL AUGMENT: NORMAL
BH CV LOWER VASCULAR LEFT COMMON FEMORAL COMPETENT: NORMAL
BH CV LOWER VASCULAR LEFT COMMON FEMORAL COMPRESS: NORMAL
BH CV LOWER VASCULAR LEFT COMMON FEMORAL PHASIC: NORMAL
BH CV LOWER VASCULAR LEFT COMMON FEMORAL SPONT: NORMAL
BH CV LOWER VASCULAR LEFT DISTAL FEMORAL COMPRESS: NORMAL
BH CV LOWER VASCULAR LEFT GASTRONEMIUS COMPRESS: NORMAL
BH CV LOWER VASCULAR LEFT GREATER SAPH AK COMPRESS: NORMAL
BH CV LOWER VASCULAR LEFT GREATER SAPH BK COMPRESS: NORMAL
BH CV LOWER VASCULAR LEFT LESSER SAPH COMPRESS: NORMAL
BH CV LOWER VASCULAR LEFT MID FEMORAL AUGMENT: NORMAL
BH CV LOWER VASCULAR LEFT MID FEMORAL COMPETENT: NORMAL
BH CV LOWER VASCULAR LEFT MID FEMORAL COMPRESS: NORMAL
BH CV LOWER VASCULAR LEFT MID FEMORAL PHASIC: NORMAL
BH CV LOWER VASCULAR LEFT MID FEMORAL SPONT: NORMAL
BH CV LOWER VASCULAR LEFT PERONEAL COMPRESS: NORMAL
BH CV LOWER VASCULAR LEFT POPLITEAL AUGMENT: NORMAL
BH CV LOWER VASCULAR LEFT POPLITEAL COMPETENT: NORMAL
BH CV LOWER VASCULAR LEFT POPLITEAL COMPRESS: NORMAL
BH CV LOWER VASCULAR LEFT POPLITEAL PHASIC: NORMAL
BH CV LOWER VASCULAR LEFT POPLITEAL SPONT: NORMAL
BH CV LOWER VASCULAR LEFT POSTERIOR TIBIAL COMPRESS: NORMAL
BH CV LOWER VASCULAR LEFT PROFUNDA FEMORAL COMPRESS: NORMAL
BH CV LOWER VASCULAR LEFT PROXIMAL FEMORAL COMPRESS: NORMAL
BH CV LOWER VASCULAR LEFT SAPHENOFEMORAL JUNCTION COMPRESS: NORMAL
BH CV LOWER VASCULAR RIGHT COMMON FEMORAL AUGMENT: NORMAL
BH CV LOWER VASCULAR RIGHT COMMON FEMORAL COMPETENT: NORMAL
BH CV LOWER VASCULAR RIGHT COMMON FEMORAL COMPRESS: NORMAL
BH CV LOWER VASCULAR RIGHT COMMON FEMORAL PHASIC: NORMAL
BH CV LOWER VASCULAR RIGHT COMMON FEMORAL SPONT: NORMAL

## 2025-05-08 PROCEDURE — 93971 EXTREMITY STUDY: CPT

## 2025-07-23 ENCOUNTER — OFFICE VISIT (OUTPATIENT)
Dept: FAMILY MEDICINE CLINIC | Facility: CLINIC | Age: 75
End: 2025-07-23
Payer: MEDICARE

## 2025-07-23 VITALS
WEIGHT: 136 LBS | HEART RATE: 74 BPM | BODY MASS INDEX: 23.22 KG/M2 | SYSTOLIC BLOOD PRESSURE: 100 MMHG | HEIGHT: 64 IN | OXYGEN SATURATION: 99 % | TEMPERATURE: 99.3 F | DIASTOLIC BLOOD PRESSURE: 60 MMHG

## 2025-07-23 DIAGNOSIS — R55 NEAR SYNCOPE: ICD-10-CM

## 2025-07-23 DIAGNOSIS — N18.2 STAGE 2 CHRONIC KIDNEY DISEASE: ICD-10-CM

## 2025-07-23 DIAGNOSIS — I49.1 PREMATURE ATRIAL CONTRACTION: ICD-10-CM

## 2025-07-23 DIAGNOSIS — Z00.00 ENCOUNTER FOR PREVENTATIVE ADULT HEALTH CARE EXAMINATION: ICD-10-CM

## 2025-07-23 DIAGNOSIS — E55.9 VITAMIN D DEFICIENCY: ICD-10-CM

## 2025-07-23 DIAGNOSIS — M81.0 AGE-RELATED OSTEOPOROSIS WITHOUT CURRENT PATHOLOGICAL FRACTURE: ICD-10-CM

## 2025-07-23 DIAGNOSIS — R60.0 PEDAL EDEMA: ICD-10-CM

## 2025-07-23 DIAGNOSIS — F41.8 ANXIETY WITH DEPRESSION: ICD-10-CM

## 2025-07-23 DIAGNOSIS — Z78.9 MEDICALLY COMPLEX PATIENT: ICD-10-CM

## 2025-07-23 DIAGNOSIS — Z12.31 ENCOUNTER FOR SCREENING MAMMOGRAM FOR BREAST CANCER: ICD-10-CM

## 2025-07-23 DIAGNOSIS — Z00.00 MEDICARE ANNUAL WELLNESS VISIT, SUBSEQUENT: Primary | ICD-10-CM

## 2025-07-23 DIAGNOSIS — E78.5 HYPERLIPIDEMIA, UNSPECIFIED HYPERLIPIDEMIA TYPE: ICD-10-CM

## 2025-07-23 DIAGNOSIS — M47.814 THORACIC SPONDYLOSIS: ICD-10-CM

## 2025-07-23 PROBLEM — R63.4 WEIGHT LOSS: Status: RESOLVED | Noted: 2025-01-21 | Resolved: 2025-07-23

## 2025-07-23 RX ORDER — TRAMADOL HYDROCHLORIDE 50 MG/1
50 TABLET ORAL EVERY 8 HOURS PRN
Qty: 40 TABLET | Refills: 2 | Status: SHIPPED | OUTPATIENT
Start: 2025-07-23

## 2025-07-23 NOTE — ASSESSMENT & PLAN NOTE
Controlled  Due to recheck lipids, LFTs  Plan to continue Crestor 10 mg daily   {Hyperlipidemia A/P Block (Optional):[id number]}

## 2025-07-23 NOTE — ASSESSMENT & PLAN NOTE
Controlled  No change with tramadol, takes as needed.  May refill as directed below  Urine Tox screen and controlled substance contract up-to-date/September 2024 appropriate.  VIPIN/ INSPECT report reviewed, appropriate.  Adequate pain relief, good medication adherence, and low risk behaviors for misuse.

## 2025-07-23 NOTE — PROGRESS NOTES
Subjective   The ABCs of the Annual Wellness Visit  Medicare Wellness Visit      Yeimi Larson is a 74 y.o. patient who presents for a Medicare Wellness Visit.    The following portions of the patient's history were reviewed and   updated as appropriate: allergies, current medications, past family history, past medical history, past social history, past surgical history, and problem list.    Routine health maintenance/screening test:  PAP --- not applicable, aged out  MAMMO --- February 2023, negative  DEXA --- March 2023, osteoporosis  Colorectal Screen --- January 2021 C-scope done, diverticuli only  Vaccines --- up-to-date  Smoking/ETOH Status --- non-smoker, social alcohol only  Dentist, Eye Exam, Derm --- maintains regular dental visits, due for ophthalmology exam, has Derm  Diet/Exercise --- maintains a healthy well-balanced diet and regular cardio exercise  Pertinent FH --- negative for premature CAD, colon cancer, breast CA (father's history unknown.)        Compared to one year ago, the patient's physical   health is the same.  Compared to one year ago, the patient's mental   health is the same.    Recent Hospitalizations:  She was not admitted to the hospital during the last year.     Current Medical Providers:  Patient Care Team:  Gregoria Cerna MD as PCP - General (Family Medicine)  Obi Franklin MD as Consulting Physician (Cardiology)  Oscar Stewart MD (Internal Medicine)  Renetta Velez MD (Neurology)  Madelyn Patterson OD (Optometry)  Gokul Garcia MD as Consulting Physician (Ophthalmology)    Outpatient Medications Prior to Visit   Medication Sig Dispense Refill    cetirizine (zyrTEC) 10 MG tablet Take 1 tablet by mouth Daily.      cholecalciferol (VITAMIN D3) 1000 units tablet Take 1 tablet by mouth Daily.      escitalopram (LEXAPRO) 20 MG tablet Take 1 tablet by mouth Daily. 90 tablet 1    ibandronate (BONIVA) 150 MG tablet Take 1 tablet by mouth Every 30 (Thirty) Days. 11  tablet 0    rosuvastatin (CRESTOR) 10 MG tablet Take 1 tablet by mouth Daily.      vitamin B-12 (CYANOCOBALAMIN) 2500 MCG sublingual tablet tablet Place 2,500 mcg under the tongue Daily.      traMADol (ULTRAM) 50 MG tablet Take 1 tablet by mouth Every 8 (Eight) Hours As Needed for Moderate Pain. 40 tablet 2     No facility-administered medications prior to visit.     Opioid medication/s are on active medication list.  and I have evaluated her active treatment plan and pain score trends (see table).  There were no vitals filed for this visit.  I have reviewed the chart for potential of high risk medication and harmful drug interactions in the elderly.        Aspirin is not on active medication list.  Aspirin use is not indicated based on review of current medical condition/s. Risk of harm outweighs potential benefits.  .    Patient Active Problem List   Diagnosis    Thoracic spondylosis    Recurrent major depressive disorder, in full remission    Age-related osteoporosis without current pathological fracture    High risk medication use    History of diverticulosis    Positive colorectal cancer screening using DNA-based stool test    DDD (degenerative disc disease), cervical    Tremor    Osteoarthritis (arthritis due to wear and tear of joints)    Grief reaction    Lightheadedness    Vitamin D deficiency    Memory loss    Dermatitis venenata    Medically complex patient    Hyperlipidemia    Stage 2 chronic kidney disease    Anxiety with depression    Dizziness    Near syncope    Small vessel disease, cerebrovascular    Left leg swelling    Premature atrial contraction    Pedal edema     Advance Care Planning Advance Directive is on file.  ACP discussion was held with the patient during this visit. Patient has an advance directive in EMR which is still valid.             Objective   Vitals:    07/23/25 1116   BP: 100/60   BP Location: Left arm   Patient Position: Sitting   Cuff Size: Adult   Pulse: 74   Temp: 99.3 °F  "(37.4 °C)   SpO2: 99%   Weight: 61.7 kg (136 lb)   Height: 162.6 cm (64\")       Estimated body mass index is 23.34 kg/m² as calculated from the following:    Height as of this encounter: 162.6 cm (64\").    Weight as of this encounter: 61.7 kg (136 lb).    BMI is within normal parameters. No other follow-up for BMI required.           Does the patient have evidence of cognitive impairment? No                                                                                                Health  Risk Assessment    Smoking Status:  Social History     Tobacco Use   Smoking Status Former    Current packs/day: 0.00    Types: Cigarettes    Quit date: 1972    Years since quittin.5    Passive exposure: Past   Smokeless Tobacco Never   Tobacco Comments    occasional smoker     Alcohol Consumption:  Social History     Substance and Sexual Activity   Alcohol Use Yes    Alcohol/week: 2.0 standard drinks of alcohol    Types: 2 Glasses of wine per week    Comment: About 3 times a week       Fall Risk Screen  MADHUADI Fall Risk Assessment was completed, and patient is at LOW risk for falls.Assessment completed on:2025    Depression Screening   Little interest or pleasure in doing things? Not at all   Feeling down, depressed, or hopeless? Not at all   PHQ-2 Total Score 0      Health Habits and Functional and Cognitive Screenin/22/2025     4:24 PM   Functional & Cognitive Status   Do you have difficulty preparing food and eating? No   Do you have difficulty bathing yourself, getting dressed or grooming yourself? No   Do you have difficulty using the toilet? No   Do you have difficulty moving around from place to place? No   Do you have trouble with steps or getting out of a bed or a chair? No   Current Diet Well Balanced Diet   Dental Exam Up to date   Eye Exam Up to date   Exercise (times per week) 5 times per week   Current Exercises Include House Cleaning;Light Weights;Walking;Weightlifting   Do you need help " using the phone?  No   Are you deaf or do you have serious difficulty hearing?  No   Do you need help to go to places out of walking distance? No   Do you need help shopping? No   Do you need help preparing meals?  No   Do you need help with housework?  No   Do you need help with laundry? No   Do you need help taking your medications? No   Do you need help managing money? No   Do you ever drive or ride in a car without wearing a seat belt? No   Have you felt unusual fatigue (could be tiredness), stress, anger or loneliness in the last month? No   Who do you live with? Alone   If you need help, do you have trouble finding someone available to you? No   Have you been bothered in the last four weeks by sexual problems? No   Do you have difficulty concentrating, remembering or making decisions? No           Age-appropriate Screening Schedule:  Refer to the list below for future screening recommendations based on patient's age, sex and/or medical conditions. Orders for these recommended tests are listed in the plan section. The patient has been provided with a written plan.    Health Maintenance List  Health Maintenance   Topic Date Due    COVID-19 Vaccine (6 - 2024-25 season) 09/01/2024    DXA SCAN  03/30/2025    ANNUAL WELLNESS VISIT  05/31/2025    INFLUENZA VACCINE  10/01/2025    LIPID PANEL  04/24/2026    TDAP/TD VACCINES (2 - Td or Tdap) 04/09/2029    COLORECTAL CANCER SCREENING  01/06/2031    HEPATITIS C SCREENING  Completed    RSV Vaccine - Adults  Completed    Pneumococcal Vaccine 50+  Completed    ZOSTER VACCINE  Completed    MAMMOGRAM  Discontinued                                                                                                                                                CMS Preventative Services Quick Reference  Risk Factors Identified During Encounter  Immunizations Discussed/Encouraged: COVID19    The above risks/problems have been discussed with the patient.  Pertinent information has been  shared with the patient in the After Visit Summary.  An After Visit Summary and PPPS were made available to the patient.    Follow Up:   Next Medicare Wellness visit to be scheduled in 1 year.         Additional E&M Note during same encounter follows:  Patient has additional, significant, and separately identifiable condition(s)/problem(s) that require work above and beyond the Medicare Wellness Visit     Chief Complaint  Medicare Wellness-subsequent (Yearly wellness visit with fasting labs last mammogram and DEXA done 2023 last cologuard testing 2020), Syncope (Follow up from near syncopal episodes, workup and cardiology visit), Hyperlipidemia, Anxiety, Depression, Thoracic spondylosis, and Osteoporosis    Subjective   HPI  Yeimi is also being seen today for an ANNUAL ADULT PREVENTATIVE PHYSICAL EXAM  .  And   Yeimi is also being seen today for ADDITIONAL MEDICAL PROBLEM/S. --Follow-up on near syncope episodes, pedal edema, new Dx premature atrial contractions  And  Needs 3-month follow-up for chronic controlled med refill/thoracic spondylosis  And  Needs 6-month follow-up on osteoporosis, anxiety/depression, and hyperlipidemia    Last visit with me in April 2025 for chronic med refills, recurrent near syncopal episodes, and concerns for increased lower extremity edema  --Reviewed extensive workup for near syncopal episodes (carotid ultrasound, head imaging, echo, Holter) and all essentially unremarkable.  However given her concern for possible POTS syndrome, she was referred to cardiologist.  -- Due to increasing left lower extremity edema and patient concerns, check Doppler to rule out DVT.  Negative  -- Encourage compression stockings, elevation of lower extremities, stay well-hydrated.  Otherwise no medication changes    Interval history since last seen by me --- she did consult with CARDIOLOGIST, seen by Dr. Franklin about 2 MONTHS AGO, records reviewed  Office Visit with Obi Franklin MD (05/01/2025) --seen in  "consultation for intermittent episodic dizziness.  Doubt POTS given no tachycardia on long-term monitoring.  Episodes only occur after prolonged standing.  Suspect more venous pooling and/or neuroautonomic cause related to her history of thoracic spondylosis?  Do not recommend further cardiac workup at this time.  Hold on performing a tilt table test unless she has true syncopal episodes  Suggested to stay well-hydrated and consider wearing compression stockings    Currently, doing much better overall.  No recurrent dizzy episodes.  Trying to be more aware of her surroundings.  Trying to drink Gatorade more often and staying well-hydrated.  Also wearing compression stockings on certain occasions.    Finally getting settled [place] [facility].  Mood remains good..  Sleep has been adequate.  Still tries to maintain a healthy well-balanced diet and regular cardio exercise.  Weight loss has resolved.  No chest pain, SOA, or increased edema.      Needs chronic med refills.  Due for 6-month fasting labs  Compliant with and tolerates all medications without side effects.       Still maintained on tramadol quantity of 1/day (may take second dose rarely) for chronic diagnosis of multiple joint OA and thoracic spondylosis.  This allows adequate pain relief, enjoyment in life, and provides the ability to maintain general activity level.  There has been no change in history that would increase the risk of overdose or other adverse events.         Review of Systems   Constitutional:  Negative for fever.   Respiratory:  Negative for cough and shortness of breath.    Cardiovascular:  Negative for chest pain.              Objective   Vital Signs:  /60 (BP Location: Left arm, Patient Position: Sitting, Cuff Size: Adult)   Pulse 74   Temp 99.3 °F (37.4 °C)   Ht 162.6 cm (64\")   Wt 61.7 kg (136 lb)   SpO2 99%   BMI 23.34 kg/m²   Physical Exam  Vitals and nursing note reviewed. "   Constitutional:       General: She is not in acute distress.     Appearance: Normal appearance. She is well-developed and normal weight. She is not ill-appearing.   HENT:      Head: Normocephalic and atraumatic.      Nose: Nose normal.   Eyes:      Conjunctiva/sclera: Conjunctivae normal.      Pupils: Pupils are equal, round, and reactive to light.   Neck:      Thyroid: No thyromegaly.      Vascular: No carotid bruit.   Cardiovascular:      Rate and Rhythm: Normal rate and regular rhythm.      Heart sounds: Normal heart sounds. No murmur heard.  Pulmonary:      Effort: Pulmonary effort is normal. No respiratory distress.      Breath sounds: Normal breath sounds. No wheezing or rales.   Abdominal:      General: Abdomen is flat. Bowel sounds are normal. There is no distension.      Palpations: Abdomen is soft. There is no hepatomegaly, splenomegaly or mass.      Tenderness: There is no abdominal tenderness. There is no guarding or rebound.      Hernia: No hernia is present.   Musculoskeletal:         General: Normal range of motion.      Cervical back: Normal range of motion and neck supple.      Right lower leg: No edema.      Left lower leg: No edema.   Feet:      Comments: Bilateral pedal edema, trace to 1+, ankle only  Lymphadenopathy:      Cervical: No cervical adenopathy.   Skin:     General: Skin is warm.      Comments: No dysplastic or abnormal lesions   Neurological:      General: No focal deficit present.      Mental Status: She is alert.   Psychiatric:         Mood and Affect: Mood normal.         Behavior: Behavior normal.         Thought Content: Thought content normal.         Judgment: Judgment normal.           Common labs          9/27/2024    13:41 1/21/2025    12:26 4/24/2025    08:28   Common Labs   Glucose 79  90  83    BUN 30  17  19    Creatinine 1.23  1.17  1.11    Sodium 139  142  141    Potassium 4.4  4.3  4.3    Chloride 102  103  104    Calcium 9.5  9.3  9.4    Albumin 4.3   4.3    Total  Bilirubin 0.4   0.7    Alkaline Phosphatase 61   74    AST (SGOT) 17   18    ALT (SGPT) 14   10    WBC 8.4      Hemoglobin 12.3      Hematocrit 39.2      Platelets 302      Total Cholesterol 151   139    Triglycerides 108   84    HDL Cholesterol 73   61    LDL Cholesterol  59   62      Lipid Panel          9/27/2024    13:41 4/24/2025    08:28   Lipid Panel   Total Cholesterol 151  139    Triglycerides 108  84    HDL Cholesterol 73  61    VLDL Cholesterol 19  16    LDL Cholesterol  59  62    LDL/HDL Ratio 0.8  1.0      TSH          9/27/2024    13:41 1/21/2025    12:26   TSH   TSH 4.270  1.950        Lab Results   Component Value Date    DVQE67TB 44.5 09/27/2024    FOLATE 6.37 03/10/2023              Assessment and Plan         Medicare annual wellness visit, subsequent  S/P subsequent MC exam done, WNL  Needed screening includes: Mammogram, DEXA, CBC, lipid profile, and COVID-vaccine.  See orders below.  Otherwise, continue with healthy lifestyle ---Needs low-carb/low calorie/low cholesterol diet and increase cardio exercise to greater than 150 minutes weekly  Encounter for preventative adult health care examination  Today's physical exam, WNL  Z00.00  Encounter for screening mammogram for breast cancer    Near syncope    S/P extensive workup completed in 2025 (brain imaging, carotid ultrasound, 2D echo, and ZIO.)  All unremarkable.  Due to patient concern for POTS, she was referred to cardiologist  Seen in consultation by Dr. Franklin in May 2025 --suspect her intermittent episodic dizzy episodes that occur only after prolonged standing to be more related to venous pooling? and/or  Neuroautonomic in nature related to her history of thoracic spondylosis?  No tachycardia on monitoring study.  Hold on performing tilt table test unless true syncope.  Recommendations are to stay well-hydrated, add electrolytes/Gatorade, and strongly consider wearing compression stockings  Premature atrial contraction  New Dx  Found on EKG  at cardiology office.  Benign.  Reassurance given.  Check electrolytes.  Pedal edema  Mildly uncontrolled  Negative for DVT.  Suspect this is dependent only.  Benign.  Reassurance given.  Again, strongly suggested compression stockings, especially given history of intermittent dizzy episodes with prolonged standing.  Anxiety with depression  Controlled  No change with Lexapro 20 mg daily at this time.  May consider decreasing to 10 mg in near future given doing better?    Hyperlipidemia, unspecified hyperlipidemia type  Controlled  Due to recheck lipids, LFTs  Plan to continue Crestor 10 mg daily     Vitamin D deficiency  Controlled, continue monitoring  Stage 2 chronic kidney disease  Controlled  Continue to monitor.  Needs to remain off NSAIDs.    Thoracic spondylosis  Controlled  No change with tramadol, takes as needed.  May refill as directed below  Urine Tox screen and controlled substance contract up-to-date/September 2024 appropriate.  VIPIN/ INSPECT report reviewed, appropriate.  Adequate pain relief, good medication adherence, and low risk behaviors for misuse.  Age-related osteoporosis without current pathological fracture  Due to recheck DEXA  Currently on Boniva, further recommendations to follow  Medically complex patient  See all the above active chronic diagnoses that require close monitoring and longitudinal care.    Orders Placed This Encounter   Procedures    DEXA Bone Density Axial     Standing Status:   Future     Expected Date:   7/28/2025     Expiration Date:   7/23/2026     Scheduling Instructions:      Prefers [facility]     Reason for Exam::   Post menopause     Release to patient:   Routine Release [[id number]]     Is patient taking or have taken long term Glucocorticoid (steroids)?:   No     Does the patient have rheumatoid arthritis?:   No     Does the patient have secondary osteoporosis?:   No    Mammo Screening Digital Tomosynthesis Bilateral With CAD     Standing Status:    Future     Expected Date:   7/28/2025     Expiration Date:   7/23/2026     Scheduling Instructions:      Patient prefers [facility]     Reason for Exam::   Screening     Release to patient:   Routine Release [[id number]]    Comprehensive Metabolic Panel     Release to patient:   Routine Release [[id number]]    Vitamin D,25-Hydroxy     Release to patient:   Routine Release [[id number]]    Lipid Panel With LDL / HDL Ratio     Release to patient:   Routine Release [[id number]]    CBC & Differential     Manual Differential:   No     Release to patient:   Routine Release [[id number]]     New Medications Ordered This Visit   Medications    traMADol (ULTRAM) 50 MG tablet     Sig: Take 1 tablet by mouth Every 8 (Eight) Hours As Needed for Moderate Pain.     Dispense:  40 tablet     Refill:  2          Follow Up   Return in about 3 months (around 10/23/2025) for Recheck.  Patient was given instructions and counseling regarding her condition or for health maintenance advice. Please see specific information pulled into the AVS if appropriate.

## 2025-07-23 NOTE — ASSESSMENT & PLAN NOTE
Controlled  Continue to monitor.  Needs to remain off NSAIDs.  {Renal Disease A/P (Optional):63701}

## 2025-07-23 NOTE — ASSESSMENT & PLAN NOTE
S/P extensive workup completed in 2025 (brain imaging, carotid ultrasound, 2D echo, and ZIO.)  All unremarkable.  Due to patient concern for POTS, she was referred to cardiologist  Seen in consultation by Dr. Franklin in May 2025 --suspect her intermittent episodic dizzy episodes that occur only after prolonged standing to be more related to venous pooling? and/or  Neuroautonomic in nature related to her history of thoracic spondylosis?  No tachycardia on monitoring study.  Hold on performing tilt table test unless true syncope.  Recommendations are to stay well-hydrated, add electrolytes/Gatorade, and strongly consider wearing compression stockings

## 2025-07-23 NOTE — ASSESSMENT & PLAN NOTE
Controlled  No change with Lexapro 20 mg daily at this time.  May consider decreasing to 10 mg in near future given doing better?  {Depression A/P Block (Optional):83199}

## 2025-07-23 NOTE — PATIENT INSTRUCTIONS
Needs mammogram and bone density, referrals have been placed    Continue medicines as planned, doing a good job    Recommendations based on labs, but I anticipate them to look good

## 2025-07-23 NOTE — ASSESSMENT & PLAN NOTE
Mildly uncontrolled  Negative for DVT.  Suspect this is dependent only.  Benign.  Reassurance given.  Again, strongly suggested compression stockings, especially given history of intermittent dizzy episodes with prolonged standing.

## 2025-07-24 LAB
25(OH)D3+25(OH)D2 SERPL-MCNC: 112 NG/ML (ref 30–100)
ALBUMIN SERPL-MCNC: 4.4 G/DL (ref 3.8–4.8)
ALP SERPL-CCNC: 65 IU/L (ref 44–121)
ALT SERPL-CCNC: 7 IU/L (ref 0–32)
AST SERPL-CCNC: 15 IU/L (ref 0–40)
BASOPHILS # BLD AUTO: 0.1 X10E3/UL (ref 0–0.2)
BASOPHILS NFR BLD AUTO: 1 %
BILIRUB SERPL-MCNC: 0.7 MG/DL (ref 0–1.2)
BUN SERPL-MCNC: 17 MG/DL (ref 8–27)
BUN/CREAT SERPL: 14 (ref 12–28)
CALCIUM SERPL-MCNC: 9.8 MG/DL (ref 8.7–10.3)
CHLORIDE SERPL-SCNC: 102 MMOL/L (ref 96–106)
CHOLEST SERPL-MCNC: 137 MG/DL (ref 100–199)
CO2 SERPL-SCNC: 24 MMOL/L (ref 20–29)
CREAT SERPL-MCNC: 1.21 MG/DL (ref 0.57–1)
EGFRCR SERPLBLD CKD-EPI 2021: 46 ML/MIN/1.73
EOSINOPHIL # BLD AUTO: 0 X10E3/UL (ref 0–0.4)
EOSINOPHIL NFR BLD AUTO: 1 %
ERYTHROCYTE [DISTWIDTH] IN BLOOD BY AUTOMATED COUNT: 12.7 % (ref 11.7–15.4)
GLOBULIN SER CALC-MCNC: 2.3 G/DL (ref 1.5–4.5)
GLUCOSE SERPL-MCNC: 87 MG/DL (ref 70–99)
HCT VFR BLD AUTO: 37.3 % (ref 34–46.6)
HDLC SERPL-MCNC: 61 MG/DL
HGB BLD-MCNC: 11.9 G/DL (ref 11.1–15.9)
IMM GRANULOCYTES # BLD AUTO: 0 X10E3/UL (ref 0–0.1)
IMM GRANULOCYTES NFR BLD AUTO: 0 %
LDLC SERPL CALC-MCNC: 56 MG/DL (ref 0–99)
LDLC/HDLC SERPL: 0.9 RATIO (ref 0–3.2)
LYMPHOCYTES # BLD AUTO: 1.8 X10E3/UL (ref 0.7–3.1)
LYMPHOCYTES NFR BLD AUTO: 40 %
MCH RBC QN AUTO: 31.2 PG (ref 26.6–33)
MCHC RBC AUTO-ENTMCNC: 31.9 G/DL (ref 31.5–35.7)
MCV RBC AUTO: 98 FL (ref 79–97)
MONOCYTES # BLD AUTO: 0.5 X10E3/UL (ref 0.1–0.9)
MONOCYTES NFR BLD AUTO: 11 %
NEUTROPHILS # BLD AUTO: 2.1 X10E3/UL (ref 1.4–7)
NEUTROPHILS NFR BLD AUTO: 47 %
PLATELET # BLD AUTO: 234 X10E3/UL (ref 150–450)
POTASSIUM SERPL-SCNC: 5.1 MMOL/L (ref 3.5–5.2)
PROT SERPL-MCNC: 6.7 G/DL (ref 6–8.5)
RBC # BLD AUTO: 3.81 X10E6/UL (ref 3.77–5.28)
SODIUM SERPL-SCNC: 140 MMOL/L (ref 134–144)
TRIGL SERPL-MCNC: 112 MG/DL (ref 0–149)
VLDLC SERPL CALC-MCNC: 20 MG/DL (ref 5–40)
WBC # BLD AUTO: 4.5 X10E3/UL (ref 3.4–10.8)

## 2025-08-04 DIAGNOSIS — N28.9 RENAL INSUFFICIENCY: ICD-10-CM

## 2025-08-04 DIAGNOSIS — N18.2 STAGE 2 CHRONIC KIDNEY DISEASE: Primary | ICD-10-CM

## 2025-08-18 ENCOUNTER — HOSPITAL ENCOUNTER (OUTPATIENT)
Dept: ULTRASOUND IMAGING | Facility: HOSPITAL | Age: 75
Discharge: HOME | End: 2025-08-18
Admitting: FAMILY MEDICINE
Payer: MEDICARE

## 2025-08-18 DIAGNOSIS — N18.2 STAGE 2 CHRONIC KIDNEY DISEASE: ICD-10-CM

## 2025-08-18 DIAGNOSIS — N28.9 RENAL INSUFFICIENCY: ICD-10-CM

## 2025-08-18 PROCEDURE — 76775 US EXAM ABDO BACK WALL LIM: CPT

## (undated) DEVICE — TUBING, SUCTION, 1/4" X 10', STRAIGHT: Brand: MEDLINE

## (undated) DEVICE — ADAPT CLN BIOGUARD AIR/H2O DISP

## (undated) DEVICE — KT ORCA ORCAPOD DISP STRL

## (undated) DEVICE — SENSR O2 OXIMAX FNGR A/ 18IN NONSTR

## (undated) DEVICE — LN SMPL CO2 SHTRM SD STREAM W/M LUER

## (undated) DEVICE — THE TORRENT IRRIGATION SCOPE CONNECTOR IS USED WITH THE TORRENT IRRIGATION TUBING TO PROVIDE IRRIGATION FLUIDS SUCH AS STERILE WATER DURING GASTROINTESTINAL ENDOSCOPIC PROCEDURES WHEN USED IN CONJUNCTION WITH AN IRRIGATION PUMP (OR ELECTROSURGICAL UNIT).: Brand: TORRENT

## (undated) DEVICE — CANN O2 ETCO2 FITS ALL CONN CO2 SMPL A/ 7IN DISP LF